# Patient Record
Sex: MALE | Race: WHITE | Employment: UNEMPLOYED | URBAN - METROPOLITAN AREA
[De-identification: names, ages, dates, MRNs, and addresses within clinical notes are randomized per-mention and may not be internally consistent; named-entity substitution may affect disease eponyms.]

---

## 2020-07-03 ENCOUNTER — HOSPITAL ENCOUNTER (EMERGENCY)
Age: 60
Discharge: HOME | End: 2020-07-03
Payer: MEDICAID

## 2020-07-03 VITALS
HEART RATE: 95 BPM | TEMPERATURE: 97.8 F | SYSTOLIC BLOOD PRESSURE: 123 MMHG | OXYGEN SATURATION: 95 % | DIASTOLIC BLOOD PRESSURE: 85 MMHG | RESPIRATION RATE: 16 BRPM

## 2020-07-03 LAB — SARS-COV-2, PCR: NOT DETECTED

## 2020-07-03 PROCEDURE — 99283 EMERGENCY DEPT VISIT LOW MDM: CPT

## 2020-07-03 PROCEDURE — U0003 INFECTIOUS AGENT DETECTION BY NUCLEIC ACID (DNA OR RNA); SEVERE ACUTE RESPIRATORY SYNDROME CORONAVIRUS 2 (SARS-COV-2) (CORONAVIRUS DISEASE [COVID-19]), AMPLIFIED PROBE TECHNIQUE, MAKING USE OF HIGH THROUGHPUT TECHNOLOGIES AS DESCRIBED BY CMS-2020-01-R: HCPCS

## 2020-07-03 RX ORDER — NAPROXEN 500 MG/1
500 TABLET ORAL 2 TIMES DAILY
Qty: 20 TABLET | Refills: 0 | Status: SHIPPED | OUTPATIENT
Start: 2020-07-03 | End: 2020-07-13

## 2020-07-03 RX ORDER — PENICILLIN V POTASSIUM 500 MG/1
500 TABLET ORAL 4 TIMES DAILY
Qty: 28 TABLET | Refills: 0 | Status: SHIPPED | OUTPATIENT
Start: 2020-07-03 | End: 2020-07-10

## 2020-07-03 ASSESSMENT — ENCOUNTER SYMPTOMS
EYE REDNESS: 0
CONSTIPATION: 0
SHORTNESS OF BREATH: 0
SORE THROAT: 0
EYE DISCHARGE: 0
ABDOMINAL DISTENTION: 0
RHINORRHEA: 0
EYE ITCHING: 0
ABDOMINAL PAIN: 0
PHOTOPHOBIA: 0
WHEEZING: 0
VOICE CHANGE: 0
EYE PAIN: 0
BACK PAIN: 0
DIARRHEA: 0
VOMITING: 0
NAUSEA: 0
SINUS PAIN: 0
STRIDOR: 0
COLOR CHANGE: 0
COUGH: 0
TROUBLE SWALLOWING: 0

## 2020-07-03 ASSESSMENT — PAIN SCALES - GENERAL: PAINLEVEL_OUTOF10: 7

## 2020-07-03 NOTE — ED NOTES
Pt tested for COVID via nasal swab, in R nostril. Pt tolerated well. Swab sent directly to Lab. Pt d/c instructions given, v/u. New Scripts for home discussed, pt v/u. Denies needs at this time. A&O with no signs of distress. Pt ambulated to exit.          Sherie Zafar RN  07/03/20 1946

## 2020-07-03 NOTE — ED PROVIDER NOTES
[address] [facility]        Pt Name: Avani Milligan  MRN: [id number]  [place] 1960  Date of evaluation: 7/3/2020  Provider: Reynold Trinidad PA-C  PCP: [facility] (Inactive)    Evaluation by KINGSLEY. My supervising physician was available for consultation. CHIEF COMPLAINT       Chief Complaint   Patient presents with    Other     Pt reporting just released from treatment and is to go to sober living today. Pt thinks he lost his filling on the bottom right and woke up this morning with pain near site radiating down neck. Pt also reports people at the treatment center have been sneezing and coughing all week and he just wants to make sure he is ok before going to sober living. HISTORY OF PRESENT ILLNESS   (Location, Timing/Onset, Context/Setting, Quality, Duration, Modifying Factors, Severity, Associated Signs and Symptoms)  Note limiting factors. Avani Milligan is a 62 y.o. male who presents to the emergency department with complaints of right lower dental pain after a filling fell off of his right lower molar about 5 days ago. He states that 3 nights ago, he was taking some food out of this area with his tongue and cut his tongue. He denies any persistent bleeding since then. He reports some tenderness to the right lower dentition with radiation into the right side of his neck since then. He also states that he has been around others who have been sick with a cough and would like to be tested for COVID-19. However, denies fever, chills, cough, shortness of breath or chest pain. He is going to sober living today and wants to be sure he is not infecting others in the facility. He believes his tetanus to be up to date. Nursing Notes were all reviewed and agreed with or any disagreements were addressed in the HPI.     REVIEW OF SYSTEMS    (2-9 systems for level 4, 10 or more for level 5)     Review of Systems Constitutional: Negative for chills and fever. HENT: Positive for dental problem and mouth sores. Negative for congestion, drooling, ear discharge, ear pain, postnasal drip, rhinorrhea, sinus pain, sore throat, tinnitus, trouble swallowing and voice change. Eyes: Negative for photophobia, pain, discharge, redness, itching and visual disturbance. Respiratory: Negative for cough, shortness of breath, wheezing and stridor. Cardiovascular: Negative for chest pain, palpitations and leg swelling. Gastrointestinal: Negative for abdominal distention, abdominal pain, constipation, diarrhea, nausea and vomiting. Endocrine: Negative. Genitourinary: Negative. Musculoskeletal: Negative for back pain, neck pain and neck stiffness. Skin: Negative for color change, pallor, rash and wound. Neurological: Negative for dizziness, tremors, seizures, syncope, facial asymmetry, speech difficulty, weakness, light-headedness, numbness and headaches. Psychiatric/Behavioral: Negative for confusion. All other systems reviewed and are negative. Positives and Pertinent negatives as per HPI. Except as noted above in the ROS, all other systems were reviewed and negative.        PAST MEDICAL HISTORY     Past Medical History:   Diagnosis Date    Alcohol abuse     MRSA (methicillin resistant staph aureus) culture positive 7/15/2015, 8/3/15    Chest    Seasonal allergies          SURGICAL HISTORY     Past Surgical History:   Procedure Laterality Date    DEBRIDEMENT Left 7/15/15    I and D left chest, left arm and flank    DEBRIDEMENT  07/27/15    I&D and debridement  Multiple wounds    OTHER SURGICAL HISTORY  7/18/2015    Incision and drainage of left axilla, chest wall, and dressing change    OTHER SURGICAL HISTORY  8/3/2015    surgical debridement multiple wounds Dr. Augustin Conklin       Previous Medications    ACETAMINOPHEN 650 MG TABS    Take 650 mg by mouth every 4 hours as needed CITALOPRAM (CELEXA) 40 MG TABLET    Take 40 mg by mouth every morning. DOCUSATE SODIUM (COLACE, DULCOLAX) 100 MG CAPS    Take 100 mg by mouth 2 times daily HOLD for loose stools    ENOXAPARIN (LOVENOX) 40 MG/0.4ML INJECTION    Inject 0.4 mLs into the skin daily    LACTOBACILLUS ACID-PECTIN (ACIDOPHILUS/CITRUS PECTIN) TABS    Take 1 tablet by mouth 2 times daily    MAGNESIUM HYDROXIDE (MILK OF MAGNESIA) 400 MG/5ML SUSPENSION    Take 30 mLs by mouth daily as needed for Constipation    MORPHINE SULFATE (MORPHINE, PF,) 2 MG/ML SOLN INJECTION    Infuse 1 mL intravenously every 3 hours as needed    MORPHINE SULFATE (MORPHINE, PF,) 4 MG/ML INJECTION    Infuse 1 mL intravenously every 3 hours as needed (extra dose as needed during Vac change)    MULTIPLE VITAMINS-MINERALS (THERAPEUTIC MULTIVITAMIN-MINERALS) TABLET    Take 1 tablet by mouth daily    SENNOSIDES-DOCUSATE SODIUM (SENOKOT-S) 8.6-50 MG TABLET    Take 2 tablets by mouth 2 times daily as needed for Constipation    SODIUM CHLORIDE FLUSH 0.9 % INJECTION    Infuse 10 mLs intravenously every 12 hours    SODIUM CHLORIDE FLUSH 0.9 % INJECTION    Infuse 10 mLs intravenously as needed for Line Care (After every IV line use)         ALLERGIES     Patient has no known allergies. FAMILYHISTORY       Family History   Problem Relation Age of Onset    Colon Cancer Mother     Coronary Art Dis Father           SOCIAL HISTORY       Social History     Tobacco Use    Smoking status: Never Smoker    Smokeless tobacco: Never Used   Substance Use Topics    Alcohol use: Not Currently     Comment: 5-8 cans of [employer] Lemonade daily    Drug use: No       SCREENINGS             PHYSICAL EXAM    (up to 7 for level 4, 8 or more for level 5)     ED Triage Vitals [07/03/20 0836]   BP Temp Temp Source Pulse Resp SpO2 Height Weight   123/85 97.8 °F (36.6 °C) Oral 95 16 95 % -- --       Physical Exam  Vitals signs and nursing note reviewed.    Constitutional:       Appearance: Normal appearance. He is well-developed. He is not toxic-appearing or diaphoretic. HENT:      Head: Normocephalic and atraumatic. Jaw: There is normal jaw occlusion. Salivary Glands: Right salivary gland is not diffusely enlarged or tender. Left salivary gland is not diffusely enlarged or tender. Right Ear: Hearing, tympanic membrane, ear canal and external ear normal.      Left Ear: Hearing, tympanic membrane, ear canal and external ear normal.      Nose: Nose normal.      Right Sinus: No maxillary sinus tenderness or frontal sinus tenderness. Left Sinus: No maxillary sinus tenderness or frontal sinus tenderness. Mouth/Throat:      Lips: Pink. Mouth: Mucous membranes are moist. No oral lesions or angioedema. Dentition: Gingival swelling and dental caries present. No dental tenderness, dental abscesses or gum lesions. Tongue: No lesions. Tongue does not deviate from midline. Palate: No mass and lesions. Pharynx: Oropharynx is clear. Uvula midline. Tonsils: No tonsillar exudate or tonsillar abscesses. 1+ on the right. 1+ on the left. Eyes:      General: No scleral icterus. Right eye: No discharge. Left eye: No discharge. Extraocular Movements: Extraocular movements intact. Conjunctiva/sclera: Conjunctivae normal.      Pupils: Pupils are equal, round, and reactive to light. Neck:      Musculoskeletal: Full passive range of motion without pain, normal range of motion and neck supple. Thyroid: No thyroid mass or thyromegaly. Vascular: Normal carotid pulses. No carotid bruit or JVD. Trachea: Trachea and phonation normal.      Meningeal: Brudzinski's sign and Kernig's sign absent. Cardiovascular:      Rate and Rhythm: Normal rate. Pulmonary:      Effort: Pulmonary effort is normal.      Breath sounds: Normal breath sounds. Abdominal:      General: Bowel sounds are normal. There is no distension.       Palpations: Abdomen is soft. Tenderness: There is no abdominal tenderness. There is no right CVA tenderness or left CVA tenderness. Musculoskeletal: Normal range of motion. Cervical back: Normal.      Thoracic back: Normal.      Lumbar back: Normal.   Lymphadenopathy:      Cervical: No cervical adenopathy. Skin:     General: Skin is warm and dry. Capillary Refill: Capillary refill takes less than 2 seconds. Coloration: Skin is not jaundiced or pale. Findings: No bruising, erythema, lesion or rash. Neurological:      General: No focal deficit present. Mental Status: He is alert and oriented to person, place, and time. Psychiatric:         Mood and Affect: Mood normal.         Behavior: Behavior normal.         DIAGNOSTIC RESULTS   LABS:    Labs Reviewed   [id number]   COVID-19   COVID-19       All other labs were within normal range or not returned as of this dictation. EKG: All EKG's are interpreted by the Emergency Department Physician in the absence of a cardiologist.  Please see their note for interpretation of EKG. RADIOLOGY:   Non-plain film images such as CT, Ultrasound and MRI are read by the radiologist. Plain radiographic images are visualized and preliminarily interpreted by the ED Provider with the below findings:        Interpretation per the Radiologist below, if available at the time of this note:    No orders to display     No results found. PROCEDURES   Unless otherwise noted below, none     Procedures    CRITICAL CARE TIME   N/A    CONSULTS:  None      EMERGENCY DEPARTMENT COURSE and DIFFERENTIAL DIAGNOSIS/MDM:   Vitals:    Vitals:    07/03/20 0836   BP: 123/85   Pulse: 95   Resp: 16   Temp: 97.8 °F (36.6 °C)   TempSrc: Oral   SpO2: 95%       Patient was given the following medications:  Medications - No data to display      This patient presents complaining of right lower dental pain after a filling fell off several days ago.   He claims that he cut his tongue against the tooth a few days ago but does not have any visible signs of injury to the tongue or persistent bleeding. He does have local gingivitis around this area. He is advised to follow-up with PCP and dentist for recheck and may return to ED per discharge instructions. Airway is patent. Meningeal signs are negative without nuchal rigidity or local lymphadenopathy or mass. Lungs are clear and abdomen is soft and nontender. He was tested for COVID-19 per his request.  He is otherwise asymptomatic however. He will be sent home with antibiotics to address his dental issue. He was given return precautions. Vital stable here, and patient is stable for discharge. My suspicion is low for otitis, dental abscess, Jimi angina, trench mouth, trigeminal neuralgia, TMJ syndrome, Bell's palsy, shingles, sialadenitis, parotiditis, mumps, strep pharyngitis, peritonsillar or tonsillar abscess, retropharyngeal abscess, bacterial tracheitis, epiglottitis, meningitis, encephalitis, foreign body, angioedema, anaphylaxis, mononucleosis, mumps, lymphoma, leukemia, asthma exacerbation, osteomyelitis, mastoiditis, sepsis, DKA, carotid dissection, sinus abscess, acute fracture, acute CVA, ICH, SAH, TIA, meningitis, encephalitis, pseudotumor cerebri, temporal arteritis, sentinel bleed from ruptured aneurysm, hypertensive urgency or emergency, subdural hematoma, epidural hematoma, ACS, PE, myocarditis, pericarditis, endocarditis, acute pulmonary edema, pleural effusion, pericardial effusion, cardiac tamponade,  CHF exacerbation, thoracic aortic dissection, esophageal rupture, other life-threatening arrhythmia, hypertensive urgency or emergency, hemothorax, pulmonary contusion, subcutaneous emphysema, flail chest, pneumo mediastinum, rib fracture or other concerning pathology. We have addressed concerns and expectations. FINAL IMPRESSION      1. Dentalgia    2.  Acute gingivitis          DISPOSITION/PLAN   DISPOSITION Decision To

## 2024-01-03 ENCOUNTER — APPOINTMENT (OUTPATIENT)
Dept: GENERAL RADIOLOGY | Age: 64
DRG: 377 | End: 2024-01-03
Payer: COMMERCIAL

## 2024-01-03 ENCOUNTER — APPOINTMENT (OUTPATIENT)
Dept: CT IMAGING | Age: 64
DRG: 377 | End: 2024-01-03
Payer: COMMERCIAL

## 2024-01-03 ENCOUNTER — HOSPITAL ENCOUNTER (INPATIENT)
Age: 64
LOS: 2 days | Discharge: HOME | DRG: 377 | End: 2024-01-05
Attending: EMERGENCY MEDICINE | Admitting: INTERNAL MEDICINE
Payer: COMMERCIAL

## 2024-01-03 DIAGNOSIS — F10.10 ALCOHOL ABUSE: ICD-10-CM

## 2024-01-03 DIAGNOSIS — S22.41XA CLOSED FRACTURE OF MULTIPLE RIBS OF RIGHT SIDE, INITIAL ENCOUNTER: ICD-10-CM

## 2024-01-03 DIAGNOSIS — J93.83 ACUTE PNEUMOTHORAX: ICD-10-CM

## 2024-01-03 DIAGNOSIS — K92.1 MELENA: ICD-10-CM

## 2024-01-03 DIAGNOSIS — K92.2 GASTROINTESTINAL HEMORRHAGE, UNSPECIFIED GASTROINTESTINAL HEMORRHAGE TYPE: Primary | ICD-10-CM

## 2024-01-03 LAB
ALBUMIN SERPL-MCNC: 4.3 G/DL (ref 3.4–5)
ALP SERPL-CCNC: 88 U/L (ref 40–129)
ALT SERPL-CCNC: 57 U/L (ref 10–40)
ANION GAP SERPL CALCULATED.3IONS-SCNC: 14 MMOL/L (ref 3–16)
APTT BLD: 30.3 SEC (ref 22.7–35.9)
AST SERPL-CCNC: 73 U/L (ref 15–37)
BASOPHILS # BLD: 0 K/UL (ref 0–0.2)
BASOPHILS # BLD: 0 K/UL (ref 0–0.2)
BASOPHILS NFR BLD: 0.1 %
BASOPHILS NFR BLD: 0.3 %
BILIRUB DIRECT SERPL-MCNC: 0.5 MG/DL (ref 0–0.3)
BILIRUB INDIRECT SERPL-MCNC: 0.9 MG/DL (ref 0–1)
BILIRUB SERPL-MCNC: 1.4 MG/DL (ref 0–1)
BUN SERPL-MCNC: 12 MG/DL (ref 7–20)
CALCIUM SERPL-MCNC: 9.9 MG/DL (ref 8.3–10.6)
CHLORIDE SERPL-SCNC: 86 MMOL/L (ref 99–110)
CO2 SERPL-SCNC: 26 MMOL/L (ref 21–32)
CREAT SERPL-MCNC: 0.6 MG/DL (ref 0.8–1.3)
DEPRECATED RDW RBC AUTO: 13.2 % (ref 12.4–15.4)
DEPRECATED RDW RBC AUTO: 13.3 % (ref 12.4–15.4)
EKG ATRIAL RATE: 108 BPM
EKG DIAGNOSIS: NORMAL
EKG P AXIS: 68 DEGREES
EKG P-R INTERVAL: 150 MS
EKG Q-T INTERVAL: 330 MS
EKG QRS DURATION: 98 MS
EKG QTC CALCULATION (BAZETT): 442 MS
EKG R AXIS: -65 DEGREES
EKG T AXIS: 53 DEGREES
EKG VENTRICULAR RATE: 108 BPM
EOSINOPHIL # BLD: 0 K/UL (ref 0–0.6)
EOSINOPHIL # BLD: 0 K/UL (ref 0–0.6)
EOSINOPHIL NFR BLD: 0.1 %
EOSINOPHIL NFR BLD: 0.4 %
ETHANOLAMINE SERPL-MCNC: NORMAL MG/DL (ref 0–0.08)
FLUAV RNA RESP QL NAA+PROBE: NOT DETECTED
FLUBV RNA RESP QL NAA+PROBE: NOT DETECTED
GFR SERPLBLD CREATININE-BSD FMLA CKD-EPI: >60 ML/MIN/{1.73_M2}
GLUCOSE SERPL-MCNC: 119 MG/DL (ref 70–99)
HCT VFR BLD AUTO: 37.2 % (ref 40.5–52.5)
HCT VFR BLD AUTO: 40.1 % (ref 40.5–52.5)
HGB BLD-MCNC: 12.7 G/DL (ref 13.5–17.5)
HGB BLD-MCNC: 13.7 G/DL (ref 13.5–17.5)
INR PPP: 1.06 (ref 0.84–1.16)
LIPASE SERPL-CCNC: 62 U/L (ref 13–60)
LYMPHOCYTES # BLD: 1.1 K/UL (ref 1–5.1)
LYMPHOCYTES # BLD: 1.4 K/UL (ref 1–5.1)
LYMPHOCYTES NFR BLD: 16.7 %
LYMPHOCYTES NFR BLD: 26 %
MCH RBC QN AUTO: 34.2 PG (ref 26–34)
MCH RBC QN AUTO: 34.9 PG (ref 26–34)
MCHC RBC AUTO-ENTMCNC: 34.1 G/DL (ref 31–36)
MCHC RBC AUTO-ENTMCNC: 34.2 G/DL (ref 31–36)
MCV RBC AUTO: 100.2 FL (ref 80–100)
MCV RBC AUTO: 102.2 FL (ref 80–100)
MONOCYTES # BLD: 0.6 K/UL (ref 0–1.3)
MONOCYTES # BLD: 0.7 K/UL (ref 0–1.3)
MONOCYTES NFR BLD: 10.9 %
MONOCYTES NFR BLD: 10.9 %
NEUTROPHILS # BLD: 3.5 K/UL (ref 1.7–7.7)
NEUTROPHILS # BLD: 4.7 K/UL (ref 1.7–7.7)
NEUTROPHILS NFR BLD: 62.6 %
NEUTROPHILS NFR BLD: 72 %
NT-PROBNP SERPL-MCNC: 387 PG/ML (ref 0–124)
PLATELET # BLD AUTO: 84 K/UL (ref 135–450)
PLATELET # BLD AUTO: 93 K/UL (ref 135–450)
PLATELET BLD QL SMEAR: ABNORMAL
PMV BLD AUTO: 8.6 FL (ref 5–10.5)
PMV BLD AUTO: 8.8 FL (ref 5–10.5)
POTASSIUM SERPL-SCNC: 4 MMOL/L (ref 3.5–5.1)
PROT SERPL-MCNC: 7.7 G/DL (ref 6.4–8.2)
PROTHROMBIN TIME: 13.8 SEC (ref 11.5–14.8)
RBC # BLD AUTO: 3.64 M/UL (ref 4.2–5.9)
RBC # BLD AUTO: 4 M/UL (ref 4.2–5.9)
SARS-COV-2 RNA RESP QL NAA+PROBE: NOT DETECTED
SLIDE REVIEW: ABNORMAL
SODIUM SERPL-SCNC: 126 MMOL/L (ref 136–145)
TROPONIN, HIGH SENSITIVITY: 6 NG/L (ref 0–22)
TROPONIN, HIGH SENSITIVITY: 8 NG/L (ref 0–22)
TROPONIN, HIGH SENSITIVITY: <6 NG/L (ref 0–22)
WBC # BLD AUTO: 5.5 K/UL (ref 4–11)
WBC # BLD AUTO: 6.6 K/UL (ref 4–11)

## 2024-01-03 PROCEDURE — 80048 BASIC METABOLIC PNL TOTAL CA: CPT

## 2024-01-03 PROCEDURE — 82077 ASSAY SPEC XCP UR&BREATH IA: CPT

## 2024-01-03 PROCEDURE — 6370000000 HC RX 637 (ALT 250 FOR IP): Performed by: INTERNAL MEDICINE

## 2024-01-03 PROCEDURE — 70450 CT HEAD/BRAIN W/O DYE: CPT

## 2024-01-03 PROCEDURE — 71250 CT THORAX DX C-: CPT

## 2024-01-03 PROCEDURE — C9113 INJ PANTOPRAZOLE SODIUM, VIA: HCPCS | Performed by: INTERNAL MEDICINE

## 2024-01-03 PROCEDURE — 85610 PROTHROMBIN TIME: CPT

## 2024-01-03 PROCEDURE — 80076 HEPATIC FUNCTION PANEL: CPT

## 2024-01-03 PROCEDURE — 74174 CTA ABD&PLVS W/CONTRAST: CPT

## 2024-01-03 PROCEDURE — A4216 STERILE WATER/SALINE, 10 ML: HCPCS | Performed by: INTERNAL MEDICINE

## 2024-01-03 PROCEDURE — 6370000000 HC RX 637 (ALT 250 FOR IP): Performed by: PHYSICIAN ASSISTANT

## 2024-01-03 PROCEDURE — 86850 RBC ANTIBODY SCREEN: CPT

## 2024-01-03 PROCEDURE — 83690 ASSAY OF LIPASE: CPT

## 2024-01-03 PROCEDURE — HZ2ZZZZ DETOXIFICATION SERVICES FOR SUBSTANCE ABUSE TREATMENT: ICD-10-PCS | Performed by: INTERNAL MEDICINE

## 2024-01-03 PROCEDURE — 71046 X-RAY EXAM CHEST 2 VIEWS: CPT

## 2024-01-03 PROCEDURE — 93005 ELECTROCARDIOGRAM TRACING: CPT | Performed by: PHYSICIAN ASSISTANT

## 2024-01-03 PROCEDURE — 36415 COLL VENOUS BLD VENIPUNCTURE: CPT

## 2024-01-03 PROCEDURE — 96374 THER/PROPH/DIAG INJ IV PUSH: CPT

## 2024-01-03 PROCEDURE — 86900 BLOOD TYPING SEROLOGIC ABO: CPT

## 2024-01-03 PROCEDURE — 2580000003 HC RX 258: Performed by: PHYSICIAN ASSISTANT

## 2024-01-03 PROCEDURE — 85730 THROMBOPLASTIN TIME PARTIAL: CPT

## 2024-01-03 PROCEDURE — 6360000002 HC RX W HCPCS: Performed by: INTERNAL MEDICINE

## 2024-01-03 PROCEDURE — 6360000002 HC RX W HCPCS: Performed by: PHYSICIAN ASSISTANT

## 2024-01-03 PROCEDURE — 6360000004 HC RX CONTRAST MEDICATION: Performed by: PHYSICIAN ASSISTANT

## 2024-01-03 PROCEDURE — 1200000000 HC SEMI PRIVATE

## 2024-01-03 PROCEDURE — 84484 ASSAY OF TROPONIN QUANT: CPT

## 2024-01-03 PROCEDURE — 99285 EMERGENCY DEPT VISIT HI MDM: CPT

## 2024-01-03 PROCEDURE — C9113 INJ PANTOPRAZOLE SODIUM, VIA: HCPCS | Performed by: PHYSICIAN ASSISTANT

## 2024-01-03 PROCEDURE — 83880 ASSAY OF NATRIURETIC PEPTIDE: CPT

## 2024-01-03 PROCEDURE — 87636 SARSCOV2 & INF A&B AMP PRB: CPT

## 2024-01-03 PROCEDURE — 86901 BLOOD TYPING SEROLOGIC RH(D): CPT

## 2024-01-03 PROCEDURE — 2580000003 HC RX 258: Performed by: INTERNAL MEDICINE

## 2024-01-03 PROCEDURE — 85025 COMPLETE CBC W/AUTO DIFF WBC: CPT

## 2024-01-03 RX ORDER — LORAZEPAM 1 MG/1
1 TABLET ORAL
Status: DISCONTINUED | OUTPATIENT
Start: 2024-01-03 | End: 2024-01-05 | Stop reason: HOSPADM

## 2024-01-03 RX ORDER — LORAZEPAM 1 MG/1
2 TABLET ORAL
Status: DISCONTINUED | OUTPATIENT
Start: 2024-01-03 | End: 2024-01-05 | Stop reason: HOSPADM

## 2024-01-03 RX ORDER — MORPHINE SULFATE 2 MG/ML
2 INJECTION, SOLUTION INTRAMUSCULAR; INTRAVENOUS EVERY 4 HOURS PRN
Status: DISCONTINUED | OUTPATIENT
Start: 2024-01-03 | End: 2024-01-05 | Stop reason: HOSPADM

## 2024-01-03 RX ORDER — LORAZEPAM 0.5 MG/1
0.5 TABLET ORAL EVERY 4 HOURS PRN
Status: DISCONTINUED | OUTPATIENT
Start: 2024-01-03 | End: 2024-01-05 | Stop reason: HOSPADM

## 2024-01-03 RX ORDER — SODIUM CHLORIDE 0.9 % (FLUSH) 0.9 %
5-40 SYRINGE (ML) INJECTION EVERY 12 HOURS SCHEDULED
Status: DISCONTINUED | OUTPATIENT
Start: 2024-01-03 | End: 2024-01-05 | Stop reason: HOSPADM

## 2024-01-03 RX ORDER — PANTOPRAZOLE SODIUM 40 MG/10ML
40 INJECTION, POWDER, LYOPHILIZED, FOR SOLUTION INTRAVENOUS ONCE
Status: COMPLETED | OUTPATIENT
Start: 2024-01-03 | End: 2024-01-03

## 2024-01-03 RX ORDER — SODIUM CHLORIDE 9 MG/ML
INJECTION, SOLUTION INTRAVENOUS PRN
Status: DISCONTINUED | OUTPATIENT
Start: 2024-01-03 | End: 2024-01-03 | Stop reason: SDUPTHER

## 2024-01-03 RX ORDER — PSEUDOEPHEDRINE HCL 30 MG
100 TABLET ORAL 2 TIMES DAILY
Status: DISCONTINUED | OUTPATIENT
Start: 2024-01-03 | End: 2024-01-04

## 2024-01-03 RX ORDER — SODIUM CHLORIDE 9 MG/ML
INJECTION, SOLUTION INTRAVENOUS CONTINUOUS
Status: DISCONTINUED | OUTPATIENT
Start: 2024-01-03 | End: 2024-01-04

## 2024-01-03 RX ORDER — ONDANSETRON 4 MG/1
4 TABLET, ORALLY DISINTEGRATING ORAL EVERY 8 HOURS PRN
Status: DISCONTINUED | OUTPATIENT
Start: 2024-01-03 | End: 2024-01-05 | Stop reason: HOSPADM

## 2024-01-03 RX ORDER — SODIUM CHLORIDE 0.9 % (FLUSH) 0.9 %
5-40 SYRINGE (ML) INJECTION PRN
Status: DISCONTINUED | OUTPATIENT
Start: 2024-01-03 | End: 2024-01-05 | Stop reason: HOSPADM

## 2024-01-03 RX ORDER — GAUZE BANDAGE 2" X 2"
100 BANDAGE TOPICAL DAILY
Status: DISCONTINUED | OUTPATIENT
Start: 2024-01-03 | End: 2024-01-05 | Stop reason: HOSPADM

## 2024-01-03 RX ORDER — LIDOCAINE 4 G/G
1 PATCH TOPICAL ONCE
Status: COMPLETED | OUTPATIENT
Start: 2024-01-03 | End: 2024-01-04

## 2024-01-03 RX ORDER — LORAZEPAM 1 MG/1
3 TABLET ORAL
Status: DISCONTINUED | OUTPATIENT
Start: 2024-01-03 | End: 2024-01-05 | Stop reason: HOSPADM

## 2024-01-03 RX ORDER — LORAZEPAM 1 MG/1
4 TABLET ORAL
Status: DISCONTINUED | OUTPATIENT
Start: 2024-01-03 | End: 2024-01-05 | Stop reason: HOSPADM

## 2024-01-03 RX ORDER — CITALOPRAM 40 MG/1
40 TABLET ORAL EVERY MORNING
Status: DISCONTINUED | OUTPATIENT
Start: 2024-01-04 | End: 2024-01-04

## 2024-01-03 RX ORDER — ONDANSETRON 2 MG/ML
4 INJECTION INTRAMUSCULAR; INTRAVENOUS EVERY 6 HOURS PRN
Status: DISCONTINUED | OUTPATIENT
Start: 2024-01-03 | End: 2024-01-05 | Stop reason: HOSPADM

## 2024-01-03 RX ORDER — OXYCODONE HYDROCHLORIDE 5 MG/1
5 TABLET ORAL EVERY 4 HOURS PRN
Status: DISCONTINUED | OUTPATIENT
Start: 2024-01-03 | End: 2024-01-05 | Stop reason: HOSPADM

## 2024-01-03 RX ORDER — SODIUM CHLORIDE 9 MG/ML
INJECTION, SOLUTION INTRAVENOUS PRN
Status: DISCONTINUED | OUTPATIENT
Start: 2024-01-03 | End: 2024-01-05 | Stop reason: HOSPADM

## 2024-01-03 RX ORDER — 0.9 % SODIUM CHLORIDE 0.9 %
1000 INTRAVENOUS SOLUTION INTRAVENOUS ONCE
Status: COMPLETED | OUTPATIENT
Start: 2024-01-03 | End: 2024-01-03

## 2024-01-03 RX ADMIN — PANTOPRAZOLE SODIUM 40 MG: 40 INJECTION, POWDER, FOR SOLUTION INTRAVENOUS at 19:33

## 2024-01-03 RX ADMIN — PANTOPRAZOLE SODIUM 40 MG: 40 INJECTION, POWDER, FOR SOLUTION INTRAVENOUS at 23:26

## 2024-01-03 RX ADMIN — Medication 100 MG: at 19:33

## 2024-01-03 RX ADMIN — OXYCODONE HYDROCHLORIDE 5 MG: 5 TABLET ORAL at 20:49

## 2024-01-03 RX ADMIN — SODIUM CHLORIDE 1000 ML: 0.9 INJECTION, SOLUTION INTRAVENOUS at 16:50

## 2024-01-03 RX ADMIN — SODIUM CHLORIDE, PRESERVATIVE FREE 10 ML: 5 INJECTION INTRAVENOUS at 23:10

## 2024-01-03 RX ADMIN — SODIUM CHLORIDE: 9 INJECTION, SOLUTION INTRAVENOUS at 23:12

## 2024-01-03 RX ADMIN — IOPAMIDOL 75 ML: 755 INJECTION, SOLUTION INTRAVENOUS at 13:52

## 2024-01-03 ASSESSMENT — ENCOUNTER SYMPTOMS
SHORTNESS OF BREATH: 0
DIARRHEA: 0
NAUSEA: 0
BLOOD IN STOOL: 1
VOMITING: 0
EYE REDNESS: 0
EYE DISCHARGE: 0
ABDOMINAL PAIN: 0

## 2024-01-03 ASSESSMENT — PAIN - FUNCTIONAL ASSESSMENT: PAIN_FUNCTIONAL_ASSESSMENT: 0-10

## 2024-01-03 ASSESSMENT — PAIN DESCRIPTION - ORIENTATION
ORIENTATION: MID;OUTER;RIGHT
ORIENTATION: RIGHT

## 2024-01-03 ASSESSMENT — PAIN DESCRIPTION - LOCATION
LOCATION: RIB CAGE
LOCATION: BACK

## 2024-01-03 ASSESSMENT — PAIN SCALES - GENERAL
PAINLEVEL_OUTOF10: 8
PAINLEVEL_OUTOF10: 5

## 2024-01-03 ASSESSMENT — LIFESTYLE VARIABLES
HOW MANY STANDARD DRINKS CONTAINING ALCOHOL DO YOU HAVE ON A TYPICAL DAY: 3 OR 4
HOW OFTEN DO YOU HAVE A DRINK CONTAINING ALCOHOL: MONTHLY OR LESS

## 2024-01-03 NOTE — ED PROVIDER NOTES
THE [facility]  EMERGENCY DEPARTMENT ENCOUNTER          PHYSICIAN ASSISTANT NOTE       Date of evaluation: 1/3/2024    Chief Complaint     Fall (Patient has been feeling rundown for the past 3 weeks, reports no eating/hydrating.Patient sustained fall on Saturday, felt lightheaded before the fall. Patient presenting with bruising on the right side of his back. Reports of black tarry stools occurring after the fall.)      History of Present Illness     Ingird Pearce is a 63 y.o. male presenting with complaints of fall on Saturday. He states that he woke up on Sunday and noticed bruising to the right rib/flank area. He states he also noticed \"back tarry\" bowel movements that began Sunday morning. They appear more brown now but still he is concerned for black in the stools. States for the last 3 weeks he has not been feeling well, he has had some post nasal drainage and generally feels \"funky\", stating he feels a little lightheaded but also notes he hasn't been eating or drinking much due to feeling generally unwell. He denies chest pain,SOB, vomiting. He has a mild sore throat from drainage he states. He denies abdominal pain.     ASSESSMENT / PLAN  (MEDICAL DECISION MAKING)     INITIAL VITALS: BP: (!) 154/83, Temp: 98.2 °F (36.8 °C), Pulse: 95, Respirations: 18, SpO2: 100 %     Briefly this is a 63 y.o. male who presents to the emergency department with generally feeling rundown, right rib pain, black stools.  Patient presented afebrile with normal vitals .  Patient was in no acute distress, nontoxic and non-hypoxic.  Patient was able to complete full sentences at bedside.  Patient was not using accessory muscles.  Patient was able to cooperate with history and physical exam.  Patient's previous charts, labs and imaging was reviewed.  Please see HPI and physical for further details.    On exam, patient overall well-appearing and in no acute cardiorespiratory distress.  Lungs are clear to auscultation

## 2024-01-03 NOTE — ED TRIAGE NOTES
[facility] Emergency Department  MEDICAL SCREENING EXAM    Date of Service: 1/3/2024    Reason for Visit: Fall (Patient has been feeling rundown for the past 3 weeks, reports no eating/hydrating.Patient sustained fall on Saturday, felt lightheaded before the fall. Patient presenting with bruising on the right side of his back. Reports of black tarry stools occurring after the fall.)        Patient History, Brief Exam, and Initial Assessment     Abbreviated HPI: Ingrid Pearce is a 63 y.o. male presenting with complaints of fall on Saturday. He states that he woke up on Sunday and noticed bruising to the right rib/flank area. He states he also noticed \"back tarry\" bowel movements that began Sunday morning. They appear more brown now but still he is concerned for black in the stools. States for the last 3 weeks he has not been feeling well, he has had some post nasal drainage and generally feels \"funky\", stating he feels a little lightheaded but also notes he hasn't been eating or drinking much due to feeling generally unwell. He denies chest pain,SOB, vomiting. He has a mild sore throat from drainage he states. He denies abdominal pain.    INITIAL VITALS: BP: (!) 154/83, Temp: 98.2 °F (36.8 °C), Pulse: 95, Respirations: 18, SpO2: 100 %    Plan     Patient was evaluated in the REU for a medical screening exam.  To further evaluate the presenting complaints, the following orders have been placed:  Orders Placed This Encounter   Procedures    COVID-19 & Influenza Combo    XR CHEST (2 VW)    CT Head W/O Contrast    CT ABDOMEN PELVIS W IV CONTRAST Additional Contrast? None    CT CHEST W CONTRAST    CBC with Auto Differential    BMP w/ Reflex to MG    Hepatic Function Panel    Lipase    Troponin    BNP    Protime-INR    APTT    Ethanol    EKG 12 Lead        See primary provider's note for full details and final disposition.     Current Facility-Administered Medications:   No orders of the defined types were placed in  this encounter.    REU Dispo     Stable for lobby while awaiting ED bed      Relevant Medical History     Past Medical History:   Diagnosis Date    Alcohol abuse     MRSA (methicillin resistant staph aureus) culture positive 7/15/2015, 8/3/15    Chest    Seasonal allergies      Past Surgical History:   Procedure Laterality Date    OTHER SURGICAL HISTORY  7/18/2015    Incision and drainage of left axilla, chest wall, and dressing change    OTHER SURGICAL HISTORY  8/3/2015    surgical debridement multiple wounds Dr. Amos    WOUND DEBRIDEMENT Left 7/15/15    I and D left chest, left arm and flank    WOUND DEBRIDEMENT  07/27/15    I&D and debridement  Multiple wounds     No Known Allergies

## 2024-01-04 ENCOUNTER — ANESTHESIA EVENT (OUTPATIENT)
Dept: ENDOSCOPY | Age: 64
End: 2024-01-04
Payer: COMMERCIAL

## 2024-01-04 ENCOUNTER — ANESTHESIA (OUTPATIENT)
Dept: ENDOSCOPY | Age: 64
End: 2024-01-04
Payer: COMMERCIAL

## 2024-01-04 ENCOUNTER — APPOINTMENT (OUTPATIENT)
Dept: GENERAL RADIOLOGY | Age: 64
DRG: 377 | End: 2024-01-04
Payer: COMMERCIAL

## 2024-01-04 LAB
ABO + RH BLD: NORMAL
ANION GAP SERPL CALCULATED.3IONS-SCNC: 11 MMOL/L (ref 3–16)
BLD GP AB SCN SERPL QL: NORMAL
BUN SERPL-MCNC: 6 MG/DL (ref 7–20)
CALCIUM SERPL-MCNC: 8.5 MG/DL (ref 8.3–10.6)
CHLORIDE SERPL-SCNC: 97 MMOL/L (ref 99–110)
CO2 SERPL-SCNC: 24 MMOL/L (ref 21–32)
CREAT SERPL-MCNC: 0.6 MG/DL (ref 0.8–1.3)
GFR SERPLBLD CREATININE-BSD FMLA CKD-EPI: >60 ML/MIN/{1.73_M2}
GLUCOSE SERPL-MCNC: 97 MG/DL (ref 70–99)
HCT VFR BLD AUTO: 33.9 % (ref 40.5–52.5)
HCT VFR BLD AUTO: 35.8 % (ref 40.5–52.5)
HEMOCCULT STL QL: ABNORMAL
HGB BLD-MCNC: 11.8 G/DL (ref 13.5–17.5)
HGB BLD-MCNC: 12.2 G/DL (ref 13.5–17.5)
POTASSIUM SERPL-SCNC: 3.6 MMOL/L (ref 3.5–5.1)
SODIUM SERPL-SCNC: 132 MMOL/L (ref 136–145)

## 2024-01-04 PROCEDURE — 2709999900 HC NON-CHARGEABLE SUPPLY: Performed by: INTERNAL MEDICINE

## 2024-01-04 PROCEDURE — 1200000000 HC SEMI PRIVATE

## 2024-01-04 PROCEDURE — 7100000010 HC PHASE II RECOVERY - FIRST 15 MIN: Performed by: INTERNAL MEDICINE

## 2024-01-04 PROCEDURE — 85014 HEMATOCRIT: CPT

## 2024-01-04 PROCEDURE — 83550 IRON BINDING TEST: CPT

## 2024-01-04 PROCEDURE — 6370000000 HC RX 637 (ALT 250 FOR IP): Performed by: INTERNAL MEDICINE

## 2024-01-04 PROCEDURE — 6360000002 HC RX W HCPCS: Performed by: INTERNAL MEDICINE

## 2024-01-04 PROCEDURE — 88342 IMHCHEM/IMCYTCHM 1ST ANTB: CPT

## 2024-01-04 PROCEDURE — 36415 COLL VENOUS BLD VENIPUNCTURE: CPT

## 2024-01-04 PROCEDURE — 71046 X-RAY EXAM CHEST 2 VIEWS: CPT

## 2024-01-04 PROCEDURE — 0DB58ZX EXCISION OF ESOPHAGUS, VIA NATURAL OR ARTIFICIAL OPENING ENDOSCOPIC, DIAGNOSTIC: ICD-10-PCS | Performed by: INTERNAL MEDICINE

## 2024-01-04 PROCEDURE — 0DB68ZX EXCISION OF STOMACH, VIA NATURAL OR ARTIFICIAL OPENING ENDOSCOPIC, DIAGNOSTIC: ICD-10-PCS | Performed by: INTERNAL MEDICINE

## 2024-01-04 PROCEDURE — 82270 OCCULT BLOOD FECES: CPT

## 2024-01-04 PROCEDURE — 83540 ASSAY OF IRON: CPT

## 2024-01-04 PROCEDURE — 3700000001 HC ADD 15 MINUTES (ANESTHESIA): Performed by: INTERNAL MEDICINE

## 2024-01-04 PROCEDURE — 85018 HEMOGLOBIN: CPT

## 2024-01-04 PROCEDURE — 2580000003 HC RX 258: Performed by: INTERNAL MEDICINE

## 2024-01-04 PROCEDURE — 2500000003 HC RX 250 WO HCPCS

## 2024-01-04 PROCEDURE — 6370000000 HC RX 637 (ALT 250 FOR IP): Performed by: PHYSICIAN ASSISTANT

## 2024-01-04 PROCEDURE — 3609012400 HC EGD TRANSORAL BIOPSY SINGLE/MULTIPLE: Performed by: INTERNAL MEDICINE

## 2024-01-04 PROCEDURE — 3700000000 HC ANESTHESIA ATTENDED CARE: Performed by: INTERNAL MEDICINE

## 2024-01-04 PROCEDURE — 80048 BASIC METABOLIC PNL TOTAL CA: CPT

## 2024-01-04 PROCEDURE — 6360000002 HC RX W HCPCS

## 2024-01-04 PROCEDURE — A4216 STERILE WATER/SALINE, 10 ML: HCPCS | Performed by: INTERNAL MEDICINE

## 2024-01-04 PROCEDURE — 88312 SPECIAL STAINS GROUP 1: CPT

## 2024-01-04 PROCEDURE — 88305 TISSUE EXAM BY PATHOLOGIST: CPT

## 2024-01-04 PROCEDURE — C9113 INJ PANTOPRAZOLE SODIUM, VIA: HCPCS | Performed by: INTERNAL MEDICINE

## 2024-01-04 RX ORDER — LIDOCAINE HYDROCHLORIDE 20 MG/ML
INJECTION, SOLUTION INFILTRATION; PERINEURAL PRN
Status: DISCONTINUED | OUTPATIENT
Start: 2024-01-04 | End: 2024-01-04 | Stop reason: SDUPTHER

## 2024-01-04 RX ORDER — PROPOFOL 10 MG/ML
INJECTION, EMULSION INTRAVENOUS PRN
Status: DISCONTINUED | OUTPATIENT
Start: 2024-01-04 | End: 2024-01-04 | Stop reason: SDUPTHER

## 2024-01-04 RX ORDER — MIDAZOLAM HYDROCHLORIDE 1 MG/ML
INJECTION INTRAMUSCULAR; INTRAVENOUS PRN
Status: DISCONTINUED | OUTPATIENT
Start: 2024-01-04 | End: 2024-01-04 | Stop reason: SDUPTHER

## 2024-01-04 RX ADMIN — SODIUM CHLORIDE: 9 INJECTION, SOLUTION INTRAVENOUS at 05:57

## 2024-01-04 RX ADMIN — OXYCODONE HYDROCHLORIDE 5 MG: 5 TABLET ORAL at 21:30

## 2024-01-04 RX ADMIN — PANTOPRAZOLE SODIUM 40 MG: 40 INJECTION, POWDER, FOR SOLUTION INTRAVENOUS at 09:07

## 2024-01-04 RX ADMIN — PROPOFOL 150 MG: 10 INJECTION, EMULSION INTRAVENOUS at 15:08

## 2024-01-04 RX ADMIN — PROPOFOL 20 MG: 10 INJECTION, EMULSION INTRAVENOUS at 15:19

## 2024-01-04 RX ADMIN — Medication 100 MG: at 09:07

## 2024-01-04 RX ADMIN — LIDOCAINE HYDROCHLORIDE 100 MG: 20 INJECTION, SOLUTION INFILTRATION; PERINEURAL at 15:08

## 2024-01-04 RX ADMIN — PROPOFOL 50 MG: 10 INJECTION, EMULSION INTRAVENOUS at 15:11

## 2024-01-04 RX ADMIN — MIDAZOLAM HYDROCHLORIDE 2 MG: 2 INJECTION, SOLUTION INTRAMUSCULAR; INTRAVENOUS at 15:00

## 2024-01-04 RX ADMIN — PROPOFOL 20 MG: 10 INJECTION, EMULSION INTRAVENOUS at 15:15

## 2024-01-04 ASSESSMENT — PAIN DESCRIPTION - ORIENTATION: ORIENTATION: RIGHT

## 2024-01-04 ASSESSMENT — PAIN - FUNCTIONAL ASSESSMENT
PAIN_FUNCTIONAL_ASSESSMENT: ACTIVITIES ARE NOT PREVENTED
PAIN_FUNCTIONAL_ASSESSMENT: NONE - DENIES PAIN

## 2024-01-04 ASSESSMENT — PAIN DESCRIPTION - DESCRIPTORS: DESCRIPTORS: SORE

## 2024-01-04 ASSESSMENT — PAIN SCALES - GENERAL: PAINLEVEL_OUTOF10: 4

## 2024-01-04 ASSESSMENT — PAIN DESCRIPTION - LOCATION: LOCATION: RIB CAGE

## 2024-01-04 ASSESSMENT — PAIN DESCRIPTION - PAIN TYPE: TYPE: ACUTE PAIN

## 2024-01-04 ASSESSMENT — PAIN DESCRIPTION - FREQUENCY: FREQUENCY: INTERMITTENT

## 2024-01-04 NOTE — CONSULTS
GI Consult Note      Admission Date: 1/3/2024  Hospital Day: Hospital Day: 2  Attending: Flynn Zamora MD  Date of service: 1/4/24    Subjective:     Chief complaint/ Reason for consult:   Melena       HPI: Ingrid Pearce is a 63 y.o.  male patient, who was seen at the request of Flynn Mauro MD.    History was obtained from chart review and the patient.         63-year-old  male with history of alcohol dependence now presents for further evaluation of melena.  Patient states that on 12/31/2023 he had 2 episodes of black stools.  He another black stool the following day.  Denies any abdominal pain hematochezia.  No iron or Pepto-Bismol ingestion.  He does take NSAIDs with 3-4 doses in the last 2 weeks.  He has had intentional weight loss he states.  He has been try to cut down on his alcohol intake.  Last alcoholic drink was 3 days ago.  No known cirrhosis or varices.  CT angio in the ER was unremarkable.  Hemoglobin 12.7.  He has occasional heartburn.  Denies any dysphagia.  No history of upper endoscopy.  Last colonoscopy he believes was in 2018 with findings of 1 polyp.      Past Endoscopic History:  As above                    Past Medical History:     Past Medical History:   Diagnosis Date    Alcohol abuse     MRSA (methicillin resistant staph aureus) culture positive 7/15/2015, 8/3/15    Chest    Seasonal allergies        Past Surgical History:    Past Surgical History:   Procedure Laterality Date    OTHER SURGICAL HISTORY  7/18/2015    Incision and drainage of left axilla, chest wall, and dressing change    OTHER SURGICAL HISTORY  8/3/2015    surgical debridement multiple wounds Dr. Amos    WOUND DEBRIDEMENT Left 7/15/15    I and D left chest, left arm and flank    WOUND DEBRIDEMENT  07/27/15    I&D and debridement  Multiple wounds         Social History:     Tobacco use:   reports that he has never smoked. He has never used smokeless tobacco.  Alcohol use:    reports that he does not currently use alcohol.  Currently lives in: [place] [id number]   reports no history of drug use.         Family History:       Problem Relation Age of Onset    Colon Cancer Mother     Coronary Art Dis Father            Medications:    sodium chloride flush  5-40 mL IntraVENous 2 times per day    thiamine  100 mg Oral Daily    sodium chloride flush  5-40 mL IntraVENous 2 times per day    pantoprazole (PROTONIX) 40 mg in sodium chloride (PF) 0.9 % 10 mL injection  40 mg IntraVENous Daily             Objective:   PHYSICAL EXAM:      Vitals:   Vitals:    01/03/24 2215 01/03/24 2247 01/04/24 0421 01/04/24 0906   BP: 125/72 114/72 128/67 (!) 156/79   Pulse: 97 96 73 81   Resp: 16 18 16 16   Temp: 98.1 °F (36.7 °C) 98 °F (36.7 °C) 98.1 °F (36.7 °C) 98.7 °F (37.1 °C)   TempSrc:  Oral Oral Oral   SpO2: 98% 97% 96% 98%   Weight:  84.1 kg (185 lb 8 oz)     Height:  1.778 m (5' 10\")         General appearance: alert, cooperative, no distress, appears stated age  Eyes: Anicteric  Head: Normocephalic, without obvious abnormality  Lungs: clear to auscultation bilaterally, Normal Effort  Heart: regular rate and rhythm, normal S1 and S2, no murmurs or rubs  Abdomen: soft, non-tender. Bowel sounds normal. No masses,  no organomegaly.   Extremities: atraumatic, no cyanosis or edema  Skin: warm and dry, no jaundice  Neuro: Grossly intact, A&OX3    Intake and output:   I/O last 3 completed shifts:  In: 2353 [P.O.:340; I.V.:1013; IV Piggyback:1000]  Out: 1650 [Urine:1650]    Lab Data:      CBC:   Recent Labs     01/03/24  1302 01/03/24  2239 01/04/24  1031   WBC 6.6 5.5  --    RBC 4.00* 3.64*  --    HGB 13.7 12.7* 12.2*   HCT 40.1* 37.2* 35.8*   PLT 93* 84*  --    .2* 102.2*  --    MCH 34.2* 34.9*  --    MCHC 34.2 34.1  --    RDW 13.2 13.3  --         BMP:  Recent Labs     01/03/24  1302 01/04/24  1031   * 132*   K 4.0 3.6   CL 86* 97*   CO2 26 24   BUN 12 6*   CREATININE 0.6* 0.6*   CALCIUM 9.9 8.5

## 2024-01-04 NOTE — CARE COORDINATION
Case Management Assessment  Initial Evaluation    Date/Time of Evaluation: 1/4/2024 1:00 PM  Assessment Completed by: Yvette Oliver RN    If patient is discharged prior to next notation, then this note serves as note for discharge by case management.    Patient Name: Ingrid Pearce                   YOB: 1960  Diagnosis: Acute pneumothorax [J93.83]  GI bleed [K92.2]  Closed fracture of multiple ribs of right side, initial encounter [S22.41XA]  Gastrointestinal hemorrhage, unspecified gastrointestinal hemorrhage type [K92.2]                   Date / Time: 1/3/2024  3:30 PM    Patient Admission Status: Inpatient   Readmission Risk (Low < 19, Mod (19-27), High > 27): Readmission Risk Score: 10.1    Current [facility]CP: [facility], Un[facility] (Inactive)  PCP verified by CM? Yes    Chart Reviewed: Yes      History Provided by: Patient  Patient Orientation: Alert and Oriented    Patient Cognition: Alert    Hospitalization in the last 30 days (Readmission):  No    If yes, Readmission Assessment in CM Navigator will be completed.    Advance Directives:      Code Status: Full Code   Patient's Primary Decision Maker is: Legal Next of Kin      Discharge Planning:    Patient lives with: Alone Type of Home: Apartment  Primary Care Giver: Self  Patient Support Systems include: Friends/Neighbors   Current Financial resources: Other (Comment)  Current community resources:    Current services prior to admission: None            Current DME:              Type of Home Care services:  None    ADLS  Prior functional level: Independent in ADLs/IADLs  Current functional level: Independent in ADLs/IADLs    PT AM-PAC:   /24  OT AM-PAC:   /24    Family can provide assistance at DC: No  Would you like Case Management to discuss the discharge plan with any other family members/significant others, and if so, who? No  Plans to Return to Present Housing: Yes  Other Identified Issues/Barriers to RETURNING to current housing: NA  Potential

## 2024-01-04 NOTE — H&P
hydronephrosis. GASTROINTESTINAL: No evidence of abnormal bowel wall thickening or obstruction. Dense material in the cecum is present on all phases. Diverticulosis. Appendix is normal. LYMPH NODES: No pathologically enlarged lymph nodes. PERITONEUM/RETROPERITONEUM: No free air or ascites. PELVIC ORGANS AND BLADDER: Unremarkable. BODY WALL AND SOFT TISSUES: Unremarkable. BONES: Acute fractures of right ribs 10 and 11. Chronic appearing fracture deformity of left rib 9.     1.  No active GI bleeding. High density material in the cecum is present on all phases. 2.  Acute fractures of right ribs 10 and 11 with tiny right-sided pneumothorax as described on concurrent CT chest. 3.  Hepatic steatosis. 4.  Diverticulosis without secondary signs of diverticulitis.     CT CHEST WO CONTRAST    Result Date: 1/3/2024  CT chest without contrast HISTORY: Fall, pain COMPARISON: none CONTRAST: 7/17/2015 TECHNIQUE: Individualized dose optimization technique was used in order to meet ALARA standards for radiation dose reduction. In addition to vendor specific dose reduction algorithms, the dose reduction techniques vary based on the specific scanner utilized but frequently include automated exposure control, adjustment of the mA and/or kV according to patient size, and use of iterative reconstruction technique. COMMENTS: Degenerative changes in the spine with scoliosis. Old nonunited right clavicular fracture. Old rib fractures are noted. Acute nondisplaced right posterior 10th rib fracture. Acute displaced right posterior 11th rib fracture. Diffuse fatty infiltration of the liver. Incompletely visualized, distended gallbladder with small gallstones. Upper abdomen is otherwise unremarkable. Coronary artery calcification. Noncontrast evaluation of the mediastinal structures is otherwise unremarkable. 2 mm left perifissural nodule image 2-49 is incidentally noted. 4 mm left apical nodule image 2-17 and 602-67 is stable in comparison to

## 2024-01-04 NOTE — PROGRESS NOTES
Patient denies any pain, nausea, or vomiting. Patient is resting supine with the HOB at 35 vitals are stable.  Called and gave report to unit nurse in care of this patient. Will call for transport take patient back to the unit from [facility] via stretcher.  All care are needs addressed at this time.

## 2024-01-04 NOTE — PROGRESS NOTES
[place]2.[place]    [facility] [place]      Name:  Ingrid Pearce /Age/Sex: 1960  (63 y.o. male)   MRN & CSN:  [id number] & [id number] Encounter Date/Time: 2024 1:53 PM EST   Location:  [facility]/6301-01 PCP: [facility], Unspecified (Inactive)     Attending:Flynn Zamora MD       Hospital Day: 2    Assessment and Recommendations   Ingrid Pearce is a 63 y.o. male with pmh of lcohol use disorder who presents with complaints of generalized weakness, lightheadedness and dizziness with black tarry stool for 3-4 days  who presents with GI bleed      Plan:   #Alcohol use disorder with episodic binge drinking.  Most recent about a week ago.  No alcohol for the past 2 days  -Patient states he drinks about 15 beers a day over the past 50 years with intermittent sober periods the longest being 7-1/2 months  -Negative ethanol levels  -Continue on thiamine, folic acid, multivitamins  -CIWA protocol with Ativan  -Fall precautions  -Social service consult     #Black tarry stool for 4 days, no with brown stool  -Rule out esophageal varices versus peptic ulcer disease  -Repeat CBC at midnight  -H&H every 6 hourly, type and screen have been stable at 12.2/35.8  -Clear liquid diet for now  -N.p.o. from midnight-okay to eat after procedure  -Protonix IV twice daily  -GI consult-discussed plan of care with GI they will take patient for EGD today     #Dehydration with hemoconcentration.  His H&H today is 13.7/40.  His last lab values are from  which shows H&H of 7-8/25)  -Patient received 1 L of normal saline in ED  -Continue maintenance IV fluids     #Thrombocytopenia-93,000.  Normal values in 2015  -Monitor CBC daily, DVT prophylaxis with SCDs     #Macrocytosis     #Electrolyte abnormalities with hyponatremia  -Monitor electrolytes and renal function with IV hydration     #Fall on 2023 with pain and bruising to the right rib cage.  CTPA with acute right 10th and 11th rib fractures with tiny right-sided apical

## 2024-01-04 NOTE — PROGRESS NOTES
Comprehensive Nutrition Assessment    RECOMMENDATIONS:  PO Diet: NPO- monitor ability to resume PO diet  ONS: Add Ensure BID when PO diet initiated  Nutrition Education: No recommendation at this time     NUTRITION ASSESSMENT:   Nutritional summary & status: Positive screen for poor po and wt loss. Pt admitted after mulitiple falls at home, fatigue and poor PO ~3 weeks. Pt also with black tarry stools. Currently NPO for testing, endorses hunger and hopeful that diet steff be advanced today. No recent wt hx in EMR, unable to assess recent wt loss. RD will continue to monitor ability to advance diet and order ONS whrn PO diet resumes.   Admission // PMH: GI Bleed // ETOH abuse    MALNUTRITION ASSESSMENT  Context of Malnutrition: Chronic Illness   Malnutrition Status: At risk for malnutrition (Comment)    NUTRITION DIAGNOSIS   Predicted inadequate energy intake related to acute injury/trauma as evidenced by NPO or clear liquid status due to medical condition    Nutrition Monitoring and Evaluation:   Food/Nutrient Intake Outcomes:  Diet Advancement/Tolerance  Physical Signs/Symptoms Outcomes:  Biochemical Data, GI Status     OBJECTIVE DATA: Significant to nutrition assessment  Nutrition Related Findings: No BM recorded this admit, black tarry stool pta, Na+ 126,   Wounds: Wound Vac (Left upper extremity)  Nutrition Goals: PO intake 50% or greater, by next RD assessment     CURRENT NUTRITION THERAPIES  Diet NPO Exceptions are: Ice Chips  PO Intake: NPO   PO Supplement Intake:NPO    COMPARATIVE STANDARDS  Energy (kcal):  1532-7029 (22-25)     Protein (g):   (1-1.2)       Fluid (ml/day):  6549-8846 ml (per MD order, noted low sodium)    ANTHROPOMETRICS  Current Height: 177.8 cm (5' 10\")  Current Weight - Scale: 84.1 kg (185 lb 8 oz)    Admission weight: 84.2 kg (185 lb 10 oz)    The patient will be monitored per nutrition standards of care. Consult dietitian if additional nutrition interventions are needed  prior to RD reassessment.     Shayy Gentile RD  [place] A:  [phone]  Office:  [phone]

## 2024-01-04 NOTE — ED NOTES
ED TO INPATIENT SBAR HANDOFF    Patient Name: Ingrid Pearce   :  1960  63 y.o.   MRN:  [id number]  Preferred Name    ED Room #:  B19/B19-19  Family/Caregiver Present no   Restraints no   Sitter no   Sepsis Risk Score Sepsis Risk Score: 1.72    Situation  Code Status: Prior No additional code details.    Allergies: Patient has no known allergies.  Weight: Patient Vitals for the past 96 hrs (Last 3 readings):   Weight   24 1243 84.2 kg (185 lb 10 oz)     Arrived from: home  Chief Complaint:   Chief Complaint   Patient presents with    Fall     Patient has been feeling rundown for the past 3 weeks, reports no eating/hydrating.Patient sustained fall on Saturday, felt lightheaded before the fall. Patient presenting with bruising on the right side of his back. Reports of black tarry stools occurring after the fall.     Hospital Problem/Diagnosis:  Principal Problem:    GI bleed  Resolved Problems:    * No resolved hospital problems. *    Imaging:   XR CHEST (2 VW)   Final Result   No acute findings.      Electronically signed by Janet Bragg      CTA ABDOMEN PELVIS W WO CONTRAST   Final Result   1.  No active GI bleeding. High density material in the cecum is present on all   phases.   2.  Acute fractures of right ribs 10 and 11 with tiny right-sided pneumothorax   as described on concurrent CT chest.   3.  Hepatic steatosis.   4.  Diverticulosis without secondary signs of diverticulitis.         CT CHEST WO CONTRAST   Final Result      Acute right 10th and 11th rib fractures.      Tiny right basilar pneumothorax.      Hepatic steatosis.      Pulmonary nodules require no follow-up.            CT Head W/O Contrast   Final Result   1.  No acute intracranial abnormality.          XR CHEST (2 VW)   Final Result      Clear lungs.      Normal cardiomediastinal silhouette.      Old right rib fractures.        Abnormal labs:   Abnormal Labs Reviewed   CBC WITH AUTO DIFFERENTIAL - Abnormal; Notable for the  following components:       Result Value    RBC 4.00 (*)     Hematocrit 40.1 (*)     .2 (*)     MCH 34.2 (*)     Platelets 93 (*)     All other components within normal limits   BASIC METABOLIC PANEL W/ REFLEX TO MG FOR LOW K - Abnormal; Notable for the following components:    Sodium 126 (*)     Chloride 86 (*)     Glucose 119 (*)     Creatinine 0.6 (*)     All other components within normal limits   HEPATIC FUNCTION PANEL - Abnormal; Notable for the following components:    ALT 57 (*)     AST 73 (*)     Total Bilirubin 1.4 (*)     Bilirubin, Direct 0.5 (*)     All other components within normal limits   LIPASE - Abnormal; Notable for the following components:    Lipase 62.0 (*)     All other components within normal limits   BRAIN NATRIURETIC PEPTIDE - Abnormal; Notable for the following components:    Pro- (*)     All other components within normal limits     Critical values: no     Abnormal Assessment Findings:     Background  History:   Past Medical History:   Diagnosis Date    Alcohol abuse     MRSA (methicillin resistant staph aureus) culture positive 7/15/2015, 8/3/15    Chest    Seasonal allergies        Assessment    Vitals/MEWS: MEWS Score: 1  Level of Consciousness: Alert (0)   Vitals:    01/03/24 1243 01/03/24 1530 01/03/24 1844   BP: (!) 154/83 128/85 (!) 147/88   Pulse: 95 91 98   Resp: 18 20 16   Temp: 98.2 °F (36.8 °C) 98.6 °F (37 °C)    TempSrc: Oral Oral    SpO2: 100% 97% 98%   Weight: 84.2 kg (185 lb 10 oz)     Height: 1.778 m (5' 10\")       FiO2 (%):   O2 Flow Rate: O2 Device: None (Room air)    Cardiac Rhythm:    Pain Assessment:  [x] Verbal [] Lozada Young Scale  Pain Scale: Pain Assessment  Pain Assessment: 0-10  Pain Level: 8  Pain Location: Back  Pain Orientation: Mid, Outer, Right  Last documented pain score (0-10 scale) Pain Level: 8  Last documented pain medication administered: NA  Mental Status: oriented, alert, coherent, logical, thought processes intact, and able to

## 2024-01-04 NOTE — ANESTHESIA POSTPROCEDURE EVALUATION
Department of Anesthesiology  Postprocedure Note    Patient: Ingrid Pearce  MRN: [id number]  YOB: 1960  Date of evaluation: 1/4/2024    Procedure Summary       Date: 01/04/24 Room / Location: [place] [address] / [facility]    Anesthesia Start: 1456 Anesthesia Stop: 1528    Procedure: EGD BIOPSY Diagnosis: Melena    Surgeons: Preet Finch MD Responsible Provider: Norbert Barajas MD    Anesthesia Type: MAC ASA Status: 2            Anesthesia Type: MAC    Timothy Phase I: Timothy Score: 10    Timothy Phase II: Timothy Score: 10    Anesthesia Post Evaluation    Patient location during evaluation: PACU  Patient participation: complete - patient participated  Level of consciousness: awake  Pain score: 0  Nausea & Vomiting: no nausea and no vomiting  Cardiovascular status: blood pressure returned to baseline  Respiratory status: acceptable  Hydration status: euvolemic  Pain management: adequate    No notable events documented.

## 2024-01-04 NOTE — PLAN OF CARE
Problem: Safety - Adult  Goal: Free from fall injury  Outcome: Progressing  Flowsheets (Taken 1/3/2024 2350)  Free From Fall Injury: Based on caregiver fall risk screen, instruct family/caregiver to ask for assistance with transferring infant if caregiver noted to have fall risk factors  Note: Patient is high risk for fall. Discuss with patient safety measures and ensure all are in place. Bed lock in lowest position, bed alarm on, non skid sock worn by patient and call light within patient reach. Patient is free from fall at this time.     Problem: Hematologic - Adult  Goal: Maintains hematologic stability  Outcome: Progressing  Flowsheets (Taken 1/3/2024 2350)  Maintains hematologic stability:   Assess for signs and symptoms of bleeding or hemorrhage   Monitor labs for bleeding or clotting disorders  Note: Patient came with complaint of Black Tarry stool. Monitor any signs of bleeding and monitor H&H.     Problem: Discharge Planning  Goal: Discharge to home or other facility with appropriate resources  Outcome: Progressing

## 2024-01-04 NOTE — ANESTHESIA PRE PROCEDURE
injection 5-40 mL  5-40 mL IntraVENous 2 times per day Brigida Soto MD   10 mL at 01/03/24 2310   • sodium chloride flush 0.9 % injection 5-40 mL  5-40 mL IntraVENous PRN Brigida Soto MD       • ondansetron (ZOFRAN-ODT) disintegrating tablet 4 mg  4 mg Oral Q8H PRN Brigida Soto MD        Or   • ondansetron (ZOFRAN) injection 4 mg  4 mg IntraVENous Q6H PRN Brigida Soto MD       • 0.9 % sodium chloride infusion   IntraVENous Continuous Brigida Soto  mL/hr at 01/04/24 1430 NoRateChange at 01/04/24 1430   • pantoprazole (PROTONIX) 40 mg in sodium chloride (PF) 0.9 % 10 mL injection  40 mg IntraVENous Daily Brigida Soto MD   40 mg at 01/04/24 0907   • morphine (PF) injection 2 mg  2 mg IntraVENous Q4H PRN Brigida Soto MD       • oxyCODONE (ROXICODONE) immediate release tablet 5 mg  5 mg Oral Q4H PRN Brigida Soto MD   5 mg at 01/03/24 2049       Allergies:  No Known Allergies    Problem List:    Patient Active Problem List   Diagnosis Code   • Cellulitis L03.90   • Sepsis ([facility]) A41.9   • Alcohol abuse F10.10   • Cellulitis of left axilla L03.112   • Hyponatremia E87.1   • Renal insufficiency N28.9   • Severe sepsis ([facility]) A41.9, R65.20   • Necrotizing fasciitis ([facility]) M72.6   • Staph aureus infection A49.01   • MRSA (methicillin resistant Staphylococcus aureus) septicemia ([facility]) A41.02   • Infection due to Enterobacteriaceae A49.8   • GI bleed K92.2       Past Medical History:        Diagnosis Date   • Alcohol abuse    • MRSA (methicillin resistant staph aureus) culture positive 7/15/2015, 8/3/15    Chest   • Seasonal allergies        Past Surgical History:        Procedure Laterality Date   • OTHER SURGICAL HISTORY  7/18/2015    Incision and drainage of left axilla, chest wall, and dressing change   • OTHER SURGICAL HISTORY  8/3/2015    surgical debridement multiple wounds Dr. Amos   • WOUND DEBRIDEMENT Left 7/15/15    I and D

## 2024-01-04 NOTE — PROGRESS NOTES
4 Eyes Admission Assessment     I agree as the admission nurse that 2 RN's have performed a thorough Head to Toe Skin Assessment on the patient. ALL assessment sites listed below have been assessed on admission.       Areas assessed by both nurses: Shanta/Natalie  [x]   Head, Face, and Ears   [x]   Shoulders, Back, and Chest  [x]   Arms, Elbows, and Hands   [x]   Coccyx, Sacrum, and Ischum  [x]   Legs, Feet, and Heels        Does the Patient have Skin Breakdown?  No         Pelon Prevention initiated:  No   Wound Care Orders initiated:  No      [facility] nurse consulted for Pressure Injury (Stage 3,4, Unstageable, DTI, NWPT, and Complex wounds):  No      Nurse 1 eSignature: Electronically signed by Shanta Juarez RN on 1/4/24 at 12:22 AM EST    **SHARE this note so that the co-signing nurse is able to place an eSignature**    Nurse 2 eSignature: Electronically signed by Natalie King RN on 1/4/24 at 5:21 AM EST

## 2024-01-04 NOTE — PROCEDURES
[facility]  Endoscopy Note    Patient: Ingrid Pearce  : 1960  Acct#:     Procedure: Esophagogastroduodenoscopy with biopsy    Date:  2024     Surgeon:  RADHA HERRERA MD      Anesthesia:    IV propofol, per anesthesia       EBL: <50 mL    Indications:  melena    Description of Procedure:    Informed consent was obtained from the patient after explanation of indications, benefits and possible risks and complications of the procedure.      The patient was then taken to the endoscopy suite, placed in the left lateral decubitus position and the above IV sedation was administrered.    The Olympus videoendoscope (GIF-H190) was placed in the patient's mouth and under direct visualization passed into the esophagus.  The scope was then advanced into the stomach and to the second portion of the duodenum.  A retroflexed exam of the gastric cardia and fundus was performed. The scope was then withdrawn back into the stomach, it was decompressed, and the scope was completely withdrawn.    Findings:  A few erosions and small ulcerations in the second portion of the duodenum.  Normal duodenal bulb.  Normal stomach.  Biopsies were obtained evaluate for H. pylori.  Grade D reflux esophagitis in the entire lower third of the esophagus.  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  No varices.  A careful biopsy was taken from 1 tongue of salmon mucosa.  Bleeding ensued from the biopsy site so no further biopsies were taken.  Hemostasis was obtained at the end of the procedure.         The patient tolerated the procedure well and was taken to the post anesthesia care unit in good condition.    Biopsies: Yes    Impression:   A few erosions and small ulcerations in the second portion of the duodenum.  Normal duodenal bulb.  Normal stomach.  Biopsies were obtained evaluate for H. pylori.  Grade D reflux esophagitis in the entire lower third of the esophagus.  Possible Greene's

## 2024-01-04 NOTE — FLOWSHEET NOTE
Received from ED % Black Tarry stool with history of fall 2x Thursday with Right Knee healed wound (Scab is present) and the last fall on Saturday 12/30 which leave him with Right side rib bruising with pain. Last drink according to him was Monday. A/Ox4 on room air. [place] to staff,bed set up, room and use of call light. Discuss with patient safety measures and ensure all are in place. VSS. Hooked to Telemetry Normal Sinus Rhythm.       01/03/24 2247   Vitals   Temp 98 °F (36.7 °C)   Temp Source Oral   Pulse 96   Heart Rate Source Monitor   Respirations 18   /72   MAP (Calculated) 86   BP Location Left upper arm   BP Upper/Lower Upper   BP Method Automatic   Patient Position Up in chair;Sitting   Pain Assessment   Pain Assessment None - Denies Pain   Opioid-Induced Sedation   POSS Score 1   RASS   [place] Agitation Sedation Scale (RASS) 0   Oxygen Therapy   SpO2 97 %   Pulse Oximeter Device Mode Intermittent   Pulse Oximeter Device Location Left;Finger   O2 Device None (Room air)

## 2024-01-05 VITALS
WEIGHT: 185.5 LBS | HEART RATE: 109 BPM | HEIGHT: 70 IN | RESPIRATION RATE: 18 BRPM | BODY MASS INDEX: 26.56 KG/M2 | TEMPERATURE: 98.5 F | OXYGEN SATURATION: 99 % | DIASTOLIC BLOOD PRESSURE: 88 MMHG | SYSTOLIC BLOOD PRESSURE: 139 MMHG

## 2024-01-05 LAB
IRON SATN MFR SERPL: 33 % (ref 20–50)
IRON SERPL-MCNC: 66 UG/DL (ref 59–158)
TIBC SERPL-MCNC: 203 UG/DL (ref 260–445)

## 2024-01-05 PROCEDURE — 6360000002 HC RX W HCPCS: Performed by: INTERNAL MEDICINE

## 2024-01-05 PROCEDURE — C9113 INJ PANTOPRAZOLE SODIUM, VIA: HCPCS | Performed by: INTERNAL MEDICINE

## 2024-01-05 PROCEDURE — 2580000003 HC RX 258: Performed by: INTERNAL MEDICINE

## 2024-01-05 PROCEDURE — A4216 STERILE WATER/SALINE, 10 ML: HCPCS | Performed by: INTERNAL MEDICINE

## 2024-01-05 PROCEDURE — 6370000000 HC RX 637 (ALT 250 FOR IP): Performed by: INTERNAL MEDICINE

## 2024-01-05 RX ORDER — CHLORDIAZEPOXIDE HYDROCHLORIDE 25 MG/1
25 CAPSULE, GELATIN COATED ORAL 3 TIMES DAILY PRN
Qty: 20 CAPSULE | Refills: 0 | Status: SHIPPED | OUTPATIENT
Start: 2024-01-05 | End: 2024-02-04

## 2024-01-05 RX ORDER — CITALOPRAM 40 MG/1
40 TABLET ORAL EVERY MORNING
Qty: 30 TABLET | Refills: 3 | Status: SHIPPED | OUTPATIENT
Start: 2024-01-05

## 2024-01-05 RX ORDER — GABAPENTIN 100 MG/1
100 CAPSULE ORAL 2 TIMES DAILY
Qty: 60 CAPSULE | Refills: 0 | Status: SHIPPED | OUTPATIENT
Start: 2024-01-05 | End: 2024-02-04

## 2024-01-05 RX ORDER — THIAMINE MONONITRATE (VIT B1) 100 MG
100 TABLET ORAL DAILY
Qty: 30 TABLET | Refills: 0 | Status: SHIPPED | OUTPATIENT
Start: 2024-01-06

## 2024-01-05 RX ORDER — PANTOPRAZOLE SODIUM 40 MG/1
40 TABLET, DELAYED RELEASE ORAL
Qty: 180 TABLET | Refills: 1 | Status: SHIPPED | OUTPATIENT
Start: 2024-01-05 | End: 2024-07-03

## 2024-01-05 RX ADMIN — Medication 100 MG: at 08:42

## 2024-01-05 RX ADMIN — SODIUM CHLORIDE, PRESERVATIVE FREE 10 ML: 5 INJECTION INTRAVENOUS at 08:45

## 2024-01-05 RX ADMIN — Medication 5 ML: at 13:09

## 2024-01-05 RX ADMIN — PANTOPRAZOLE SODIUM 40 MG: 40 INJECTION, POWDER, FOR SOLUTION INTRAVENOUS at 08:42

## 2024-01-05 NOTE — PROGRESS NOTES
D/C order noted. SL and tele removed. Pt verbalized understanding of how to properly take librium and other new medications. States he will follow up with outpatient alcohol reha. Transported off unit per wheelchair.

## 2024-01-05 NOTE — DISCHARGE SUMMARY
V2.0  Discharge Summary    Name:  Ingrid Pearce /Age/Sex: 1960 (63 y.o. male)   Admit Date: 1/3/2024  Discharge Date: 24    MRN & CSN:  [id number] & [id number] Encounter Date and Time 24 2:52 PM EST    Attending:  Flynn Zamora MD Discharging Provider: ARCADIO Burton [facility] Course:     Brief HPI: Ingrid Pearce is a 63 y.o. male who presented with  pmh of lcohol use disorder who presents with complaints of generalized weakness, lightheadedness and dizziness with black tarry stool for 3-4 days  who presents with GI bleed     Brief Problem Based Course:   Plan:   #Alcohol use disorder with episodic binge drinking.  Most recent about a week ago.  No alcohol for the past 2 days  -Patient states he drinks about 15 beers a day over the past 50 years with intermittent sober periods the longest being 7-1/2 months  -Negative ethanol levels  -Continue on thiamine, folic acid, multivitamins  -CIWA protocol with Ativan  -Fall precautions  -Social service consult  -Patient was advised to quit alcohol; Librium ordered; patient does not want to go to alcohol rehab     #Black tarry stool for 4 days, no with brown stool  -Rule out esophageal varices versus peptic ulcer disease  -Repeat CBC at midnight  -H&H every 6 hourly, type and screen have been stable at 12.2/35.8  -Clear liquid diet for now  -N.p.o. from midnight-okay to eat after procedure  -Protonix IV twice daily  -GI consult-discussed plan of care with GI they will take patient for EGD today  Patient under went EGD which showed few erosions and small ulceration in the small portion of duodenum.  Grade D reflux esophagitis was noted.  Biopsy pending.  GI recommended PPI twice daily for 8 weeks then daily.     #Dehydration with hemoconcentration.  His H&H today is 13.7/40.  His last lab values are from  which shows H&H of 7-8/25)  -Patient received 1 L of normal saline in ED  -Continue maintenance IV fluids      GLUCOSE 119* 97     Hepatic:   Recent Labs     01/03/24  1302   AST 73*   ALT 57*   BILITOT 1.4*   ALKPHOS 88     Lipids: No results found for: \"CHOL\", \"HDL\", \"TRIG\"  Hemoglobin A1C: No results found for: \"LABA1C\"  TSH: No results found for: \"TSH\"  Troponin: No results found for: \"TROPONINT\"  Lactic Acid: No results for input(s): \"LACTA\" in the last 72 hours.  BNP:   Recent Labs     01/03/24  1302   PROBNP 387*     UA:  Lab Results   Component Value Date/Time    NITRU see below 07/14/2015 07:33 PM    COLORU ORANGE 07/14/2015 07:33 PM    PHUR see below 07/14/2015 07:33 PM    LABCAST 3-5 Fine Gran. 07/14/2015 07:33 PM    WBCUA 30 07/14/2015 07:33 PM    RBCUA 5-10 07/14/2015 07:33 PM    CLARITYU TURBID 07/14/2015 07:33 PM    SPECGRAV 1.027 07/14/2015 07:33 PM    LEUKOCYTESUR see below 07/14/2015 07:33 PM    UROBILINOGEN see below 07/14/2015 07:33 PM    BILIRUBINUR see below 07/14/2015 07:33 PM    BLOODU see below 07/14/2015 07:33 PM    GLUCOSEU see below 07/14/2015 07:33 PM    KETUA see below 07/14/2015 07:33 PM     Urine Cultures: No results found for: \"LABURIN\"  Blood Cultures: No results found for: \"BC\"  No results found for: \"BLOODCULT2\"  Organism: No results found for: \"ORG\"    Time Spent Discharging patient >31 minutes    Electronically signed by ARCADIO Burton CNP on 1/5/2024 at 2:52 PM

## 2024-01-05 NOTE — CARE COORDINATION
Case Management Assessment            Discharge Note                    Date / Time of Note: 1/5/2024 1:38 PM                  Discharge Note Completed by: Yvette Oliver RN    Patient Name: Ingrid Pearce   YOB: 1960  Diagnosis: Acute pneumothorax [J93.83]  GI bleed [K92.2]  Closed fracture of multiple ribs of right side, initial encounter [S22.41XA]  Gastrointestinal hemorrhage, unspecified gastrointestinal hemorrhage type [K92.2]   Date / Time: 1/3/2024  3:30 PM    Current PCP: [facility] (Inactive)  Clinic patient: Yes    Hospitalization in the last 30 days: No       Advance Directives:  Code Status: Full Code  [place] DNR form completed and on chart: No    Financial:  Payor: HUMANA / Plan: HUMANA (PPO) / Product Type: *No Product type* /      Pharmacy:  No Pharmacies Listed    Assistance purchasing medications?: Potential Assistance Purchasing Medications: No  Assistance provided by Case Management: None at this time    Does patient want to participate in local refill/ meds to beds program?:      Meds To Beds General Rules:  1. Can ONLY be done Monday- Friday between 8:30am-5pm  2. Prescription(s) must be in pharmacy by 3pm to be filled same day  3.Copy of patient's insurance/ prescription drug card and patient face sheet must be sent along with the prescription(s)  4. Cost of Rx cannot be added to hospital bill. If financial assistance is needed, please contact unit  or ;  or  CANNOT provide pharmacy voucher for patients co-pays  5. Patients can then  the prescription on their way out of the hospital at discharge, or pharmacy can deliver to the bedside if staff is available. (payment due at time of pick-up or delivery - cash, check, or card accepted)     Able to afford home medications/ co-pay costs: Yes    ADLS:  Current PT AM-PAC Score:   /24  Current OT AM-PAC Score:   /24      DISCHARGE Disposition: Home- No Services  related to the following treatment goals of Acute pneumothorax [J93.83]  GI bleed [K92.2]  Closed fracture of multiple ribs of right side, initial encounter [S22.41XA]  Gastrointestinal hemorrhage, unspecified gastrointestinal hemorrhage type [K92.2]    The Patient and/or patient representative Ingrid and his family were provided with a choice of provider and agrees with the discharge plan Yes    Freedom of choice list was provided with basic dialogue that supports the patient's individualized plan of care/goals and shares the quality data associated with the providers. Yes    Care Transitions patient: No    Yvette Oliver RN  [facility]  Case Management Department  Ph: [phone]

## 2024-01-05 NOTE — PLAN OF CARE
Problem: Discharge Planning  Goal: Discharge to home or other facility with appropriate resources  Outcome: Progressing     Problem: Safety - Adult  Goal: Free from fall injury  Outcome: Progressing  Note: Pt remains free from falls through this shift, alert and oriented x4, ambulates independently with steady gait. Call light and pt belongings within reach, uses call light appropriately. Will continue to monitor.     Problem: Hematologic - Adult  Goal: Maintains hematologic stability  Outcome: Progressing  Note: Pt has no S/S of bleeding, H&H stable.     Problem: Nutrition Deficit:  Goal: Optimize nutritional status  Outcome: Progressing     Problem: Pain  Goal: Verbalizes/displays adequate comfort level or baseline comfort level  Outcome: Progressing  Note: Pt complains of slight discomfort on right side of ribcage. Pt medicated with PRN oxycodone as ordered. Pt resting comfortably in bed. Will continue to monitor.

## 2024-01-05 NOTE — PROGRESS NOTES
[place] [facility]  Gastroenterology Progress Note    Ingrid Pearce is a 63 y.o. male patient.  Principal Problem:    GI bleed  Resolved Problems:    * No resolved hospital problems. *      SUBJECTIVE:    No further bleeding.  No bowel movements.  No abdominal pain    Physical    VITALS:  /88   Pulse (!) 109   Temp 98.5 °F (36.9 °C) (Oral)   Resp 18   Ht 1.778 m (5' 10\")   Wt 84.1 kg (185 lb 8 oz)   SpO2 99%   BMI 26.62 kg/m²   TEMPERATURE:  Current - Temp: 98.5 °F (36.9 °C); Max - Temp  Av °F (36.7 °C)  Min: 97 °F (36.1 °C)  Max: 98.5 °F (36.9 °C)    NAD, age appropriate  Integ: no rash, jaundice, ecchymoses  Abdomen soft, ND, NT, no HSM, Bowel sounds normal.    Data    Data Review:    Recent Labs     24  1302 24  2239 24  1031 24  1828   WBC 6.6 5.5  --   --    HGB 13.7 12.7* 12.2* 11.8*   HCT 40.1* 37.2* 35.8* 33.9*   .2* 102.2*  --   --    PLT 93* 84*  --   --      Recent Labs     24  1302 24  1031   * 132*   K 4.0 3.6   CL 86* 97*   CO2 26 24   BUN 12 6*   CREATININE 0.6* 0.6*     Recent Labs     24  1302   AST 73*   ALT 57*   BILIDIR 0.5*   BILITOT 1.4*   ALKPHOS 88     Recent Labs     24  1302   LIPASE 62.0*     Recent Labs     24  1302   PROTIME 13.8   INR 1.06     No results for input(s): \"PTT\" in the last 72 hours.          ASSESSMENT   1.  Melena-no further episodes.  EGD done yesterday showed  Impression:   A few erosions and small ulcerations in the second portion of the duodenum.  Normal duodenal bulb.  Normal stomach.  Biopsies were obtained evaluate for H. pylori.  Grade D reflux esophagitis in the entire lower third of the esophagus.  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  No varices.  A careful biopsy was taken from 1 tongue of salmon mucosa.  Bleeding ensued from the biopsy site so no further biopsies were taken.  Hemostasis was obtained at the end of the procedure.     Recommendations:

## 2024-01-18 ENCOUNTER — APPOINTMENT (OUTPATIENT)
Dept: CT IMAGING | Age: 64
DRG: 897 | End: 2024-01-18
Payer: MEDICARE

## 2024-01-18 ENCOUNTER — APPOINTMENT (OUTPATIENT)
Dept: GENERAL RADIOLOGY | Age: 64
DRG: 897 | End: 2024-01-18
Payer: MEDICARE

## 2024-01-18 ENCOUNTER — HOSPITAL ENCOUNTER (INPATIENT)
Age: 64
LOS: 4 days | Discharge: HOME | DRG: 897 | End: 2024-01-22
Attending: EMERGENCY MEDICINE | Admitting: INTERNAL MEDICINE
Payer: MEDICARE

## 2024-01-18 DIAGNOSIS — S09.90XA CLOSED HEAD INJURY, INITIAL ENCOUNTER: Primary | ICD-10-CM

## 2024-01-18 DIAGNOSIS — F10.929 ACUTE ALCOHOLIC INTOXICATION WITH COMPLICATION (HCC): ICD-10-CM

## 2024-01-18 DIAGNOSIS — R55 SYNCOPE AND COLLAPSE: ICD-10-CM

## 2024-01-18 LAB
ALBUMIN SERPL-MCNC: 3.8 G/DL (ref 3.4–5)
ALP SERPL-CCNC: 107 U/L (ref 40–129)
ALT SERPL-CCNC: 43 U/L (ref 10–40)
ANION GAP SERPL CALCULATED.3IONS-SCNC: 15 MMOL/L (ref 3–16)
AST SERPL-CCNC: 52 U/L (ref 15–37)
BASE EXCESS BLDV CALC-SCNC: 0.7 MMOL/L (ref -2–3)
BASOPHILS # BLD: 0 K/UL (ref 0–0.2)
BASOPHILS NFR BLD: 0.3 %
BILIRUB DIRECT SERPL-MCNC: <0.2 MG/DL (ref 0–0.3)
BILIRUB INDIRECT SERPL-MCNC: ABNORMAL MG/DL (ref 0–1)
BILIRUB SERPL-MCNC: 0.3 MG/DL (ref 0–1)
BILIRUB UR QL STRIP.AUTO: NEGATIVE
BUN SERPL-MCNC: 5 MG/DL (ref 7–20)
CALCIUM SERPL-MCNC: 8.8 MG/DL (ref 8.3–10.6)
CHLORIDE SERPL-SCNC: 95 MMOL/L (ref 99–110)
CLARITY UR: CLEAR
CO2 BLDV-SCNC: 29 MMOL/L
CO2 SERPL-SCNC: 23 MMOL/L (ref 21–32)
COHGB MFR BLDV: 1.2 % (ref 0–1.5)
COLOR UR: YELLOW
CREAT SERPL-MCNC: 0.7 MG/DL (ref 0.8–1.3)
DEPRECATED RDW RBC AUTO: 15.4 % (ref 12.4–15.4)
DO-HGB MFR BLDV: 39.1 %
EOSINOPHIL # BLD: 0 K/UL (ref 0–0.6)
EOSINOPHIL NFR BLD: 0.2 %
ETHANOLAMINE SERPL-MCNC: 202 MG/DL (ref 0–0.08)
GFR SERPLBLD CREATININE-BSD FMLA CKD-EPI: >60 ML/MIN/{1.73_M2}
GLUCOSE SERPL-MCNC: 92 MG/DL (ref 70–99)
GLUCOSE UR STRIP.AUTO-MCNC: NEGATIVE MG/DL
HCO3 BLDV-SCNC: 27.4 MMOL/L (ref 24–28)
HCT VFR BLD AUTO: 43.4 % (ref 40.5–52.5)
HGB BLD-MCNC: 14.5 G/DL (ref 13.5–17.5)
HGB UR QL STRIP.AUTO: NEGATIVE
KETONES UR STRIP.AUTO-MCNC: NEGATIVE MG/DL
LACTATE BLDV-SCNC: 2.8 MMOL/L (ref 0.4–1.9)
LACTATE BLDV-SCNC: 3.1 MMOL/L (ref 0.4–1.9)
LEUKOCYTE ESTERASE UR QL STRIP.AUTO: NEGATIVE
LIPASE SERPL-CCNC: 29 U/L (ref 13–60)
LYMPHOCYTES # BLD: 1.2 K/UL (ref 1–5.1)
LYMPHOCYTES NFR BLD: 14.5 %
MAGNESIUM SERPL-MCNC: 1.9 MG/DL (ref 1.8–2.4)
MCH RBC QN AUTO: 35 PG (ref 26–34)
MCHC RBC AUTO-ENTMCNC: 33.3 G/DL (ref 31–36)
MCV RBC AUTO: 104.9 FL (ref 80–100)
METHGB MFR BLDV: 0.2 % (ref 0–1.5)
MONOCYTES # BLD: 0.5 K/UL (ref 0–1.3)
MONOCYTES NFR BLD: 6.8 %
NEUTROPHILS # BLD: 6.3 K/UL (ref 1.7–7.7)
NEUTROPHILS NFR BLD: 78.2 %
NITRITE UR QL STRIP.AUTO: NEGATIVE
PCO2 BLDV: 50.5 MMHG (ref 41–51)
PH BLDV: 7.34 [PH] (ref 7.35–7.45)
PH UR STRIP.AUTO: 6 [PH] (ref 5–8)
PLATELET # BLD AUTO: 190 K/UL (ref 135–450)
PMV BLD AUTO: 7.9 FL (ref 5–10.5)
PO2 BLDV: 36.1 MMHG (ref 25–40)
POTASSIUM SERPL-SCNC: 3.9 MMOL/L (ref 3.5–5.1)
PROT SERPL-MCNC: 7.1 G/DL (ref 6.4–8.2)
PROT UR STRIP.AUTO-MCNC: NEGATIVE MG/DL
RBC # BLD AUTO: 4.14 M/UL (ref 4.2–5.9)
SAO2 % BLDV: 60 %
SODIUM SERPL-SCNC: 133 MMOL/L (ref 136–145)
SP GR UR STRIP.AUTO: 1.01 (ref 1–1.03)
TROPONIN, HIGH SENSITIVITY: 6 NG/L (ref 0–22)
TROPONIN, HIGH SENSITIVITY: 7 NG/L (ref 0–22)
UA COMPLETE W REFLEX CULTURE PNL UR: NORMAL
UA DIPSTICK W REFLEX MICRO PNL UR: NORMAL
URN SPEC COLLECT METH UR: NORMAL
UROBILINOGEN UR STRIP-ACNC: 0.2 E.U./DL
WBC # BLD AUTO: 8 K/UL (ref 4–11)

## 2024-01-18 PROCEDURE — 82077 ASSAY SPEC XCP UR&BREATH IA: CPT

## 2024-01-18 PROCEDURE — 83735 ASSAY OF MAGNESIUM: CPT

## 2024-01-18 PROCEDURE — 85025 COMPLETE CBC W/AUTO DIFF WBC: CPT

## 2024-01-18 PROCEDURE — 1200000000 HC SEMI PRIVATE

## 2024-01-18 PROCEDURE — 72125 CT NECK SPINE W/O DYE: CPT

## 2024-01-18 PROCEDURE — 82803 BLOOD GASES ANY COMBINATION: CPT

## 2024-01-18 PROCEDURE — 6370000000 HC RX 637 (ALT 250 FOR IP): Performed by: INTERNAL MEDICINE

## 2024-01-18 PROCEDURE — 80076 HEPATIC FUNCTION PANEL: CPT

## 2024-01-18 PROCEDURE — 93005 ELECTROCARDIOGRAM TRACING: CPT

## 2024-01-18 PROCEDURE — 83605 ASSAY OF LACTIC ACID: CPT

## 2024-01-18 PROCEDURE — 81003 URINALYSIS AUTO W/O SCOPE: CPT

## 2024-01-18 PROCEDURE — 6360000002 HC RX W HCPCS: Performed by: EMERGENCY MEDICINE

## 2024-01-18 PROCEDURE — 2580000003 HC RX 258: Performed by: INTERNAL MEDICINE

## 2024-01-18 PROCEDURE — 71045 X-RAY EXAM CHEST 1 VIEW: CPT

## 2024-01-18 PROCEDURE — 99285 EMERGENCY DEPT VISIT HI MDM: CPT

## 2024-01-18 PROCEDURE — 80048 BASIC METABOLIC PNL TOTAL CA: CPT

## 2024-01-18 PROCEDURE — 2500000003 HC RX 250 WO HCPCS: Performed by: EMERGENCY MEDICINE

## 2024-01-18 PROCEDURE — 84484 ASSAY OF TROPONIN QUANT: CPT

## 2024-01-18 PROCEDURE — 83690 ASSAY OF LIPASE: CPT

## 2024-01-18 PROCEDURE — 2580000003 HC RX 258: Performed by: EMERGENCY MEDICINE

## 2024-01-18 PROCEDURE — 70450 CT HEAD/BRAIN W/O DYE: CPT

## 2024-01-18 RX ORDER — CITALOPRAM 40 MG/1
40 TABLET ORAL EVERY MORNING
Status: DISCONTINUED | OUTPATIENT
Start: 2024-01-19 | End: 2024-01-22 | Stop reason: HOSPADM

## 2024-01-18 RX ORDER — POTASSIUM CHLORIDE 20 MEQ/1
40 TABLET, EXTENDED RELEASE ORAL PRN
Status: DISCONTINUED | OUTPATIENT
Start: 2024-01-18 | End: 2024-01-22 | Stop reason: HOSPADM

## 2024-01-18 RX ORDER — ONDANSETRON 2 MG/ML
4 INJECTION INTRAMUSCULAR; INTRAVENOUS EVERY 6 HOURS PRN
Status: DISCONTINUED | OUTPATIENT
Start: 2024-01-18 | End: 2024-01-22 | Stop reason: HOSPADM

## 2024-01-18 RX ORDER — PANTOPRAZOLE SODIUM 40 MG/1
40 TABLET, DELAYED RELEASE ORAL
Status: DISCONTINUED | OUTPATIENT
Start: 2024-01-19 | End: 2024-01-22 | Stop reason: HOSPADM

## 2024-01-18 RX ORDER — FOLIC ACID 1 MG/1
1 TABLET ORAL DAILY
Status: DISCONTINUED | OUTPATIENT
Start: 2024-01-19 | End: 2024-01-22 | Stop reason: HOSPADM

## 2024-01-18 RX ORDER — SODIUM CHLORIDE 9 MG/ML
INJECTION, SOLUTION INTRAVENOUS CONTINUOUS
Status: ACTIVE | OUTPATIENT
Start: 2024-01-18 | End: 2024-01-19

## 2024-01-18 RX ORDER — MULTIVITAMIN WITH IRON
1 TABLET ORAL DAILY
Status: DISCONTINUED | OUTPATIENT
Start: 2024-01-19 | End: 2024-01-22 | Stop reason: HOSPADM

## 2024-01-18 RX ORDER — SODIUM CHLORIDE 0.9 % (FLUSH) 0.9 %
5-40 SYRINGE (ML) INJECTION PRN
Status: DISCONTINUED | OUTPATIENT
Start: 2024-01-18 | End: 2024-01-22 | Stop reason: HOSPADM

## 2024-01-18 RX ORDER — LORAZEPAM 1 MG/1
4 TABLET ORAL
Status: DISCONTINUED | OUTPATIENT
Start: 2024-01-18 | End: 2024-01-22 | Stop reason: HOSPADM

## 2024-01-18 RX ORDER — LORAZEPAM 1 MG/1
2 TABLET ORAL
Status: DISCONTINUED | OUTPATIENT
Start: 2024-01-18 | End: 2024-01-22 | Stop reason: HOSPADM

## 2024-01-18 RX ORDER — SODIUM CHLORIDE 0.9 % (FLUSH) 0.9 %
5-40 SYRINGE (ML) INJECTION EVERY 12 HOURS SCHEDULED
Status: DISCONTINUED | OUTPATIENT
Start: 2024-01-18 | End: 2024-01-22 | Stop reason: HOSPADM

## 2024-01-18 RX ORDER — LORAZEPAM 2 MG/ML
2 INJECTION INTRAMUSCULAR
Status: DISCONTINUED | OUTPATIENT
Start: 2024-01-18 | End: 2024-01-22 | Stop reason: HOSPADM

## 2024-01-18 RX ORDER — LORAZEPAM 2 MG/ML
4 INJECTION INTRAMUSCULAR
Status: DISCONTINUED | OUTPATIENT
Start: 2024-01-18 | End: 2024-01-22 | Stop reason: HOSPADM

## 2024-01-18 RX ORDER — LORAZEPAM 2 MG/ML
3 INJECTION INTRAMUSCULAR
Status: DISCONTINUED | OUTPATIENT
Start: 2024-01-18 | End: 2024-01-22 | Stop reason: HOSPADM

## 2024-01-18 RX ORDER — SODIUM CHLORIDE 9 MG/ML
INJECTION, SOLUTION INTRAVENOUS PRN
Status: DISCONTINUED | OUTPATIENT
Start: 2024-01-18 | End: 2024-01-22 | Stop reason: HOSPADM

## 2024-01-18 RX ORDER — MAGNESIUM SULFATE IN WATER 40 MG/ML
2000 INJECTION, SOLUTION INTRAVENOUS PRN
Status: DISCONTINUED | OUTPATIENT
Start: 2024-01-18 | End: 2024-01-22 | Stop reason: HOSPADM

## 2024-01-18 RX ORDER — LORAZEPAM 2 MG/ML
1 INJECTION INTRAMUSCULAR
Status: DISCONTINUED | OUTPATIENT
Start: 2024-01-18 | End: 2024-01-22 | Stop reason: HOSPADM

## 2024-01-18 RX ORDER — LORAZEPAM 1 MG/1
3 TABLET ORAL
Status: DISCONTINUED | OUTPATIENT
Start: 2024-01-18 | End: 2024-01-22 | Stop reason: HOSPADM

## 2024-01-18 RX ORDER — GAUZE BANDAGE 2" X 2"
100 BANDAGE TOPICAL DAILY
Status: DISCONTINUED | OUTPATIENT
Start: 2024-01-19 | End: 2024-01-22 | Stop reason: HOSPADM

## 2024-01-18 RX ORDER — ONDANSETRON 4 MG/1
4 TABLET, ORALLY DISINTEGRATING ORAL EVERY 8 HOURS PRN
Status: DISCONTINUED | OUTPATIENT
Start: 2024-01-18 | End: 2024-01-22 | Stop reason: HOSPADM

## 2024-01-18 RX ORDER — POTASSIUM CHLORIDE 7.45 MG/ML
10 INJECTION INTRAVENOUS PRN
Status: DISCONTINUED | OUTPATIENT
Start: 2024-01-18 | End: 2024-01-22 | Stop reason: HOSPADM

## 2024-01-18 RX ORDER — POLYETHYLENE GLYCOL 3350 17 G/17G
17 POWDER, FOR SOLUTION ORAL DAILY PRN
Status: DISCONTINUED | OUTPATIENT
Start: 2024-01-18 | End: 2024-01-22 | Stop reason: HOSPADM

## 2024-01-18 RX ORDER — LORAZEPAM 1 MG/1
1 TABLET ORAL
Status: DISCONTINUED | OUTPATIENT
Start: 2024-01-18 | End: 2024-01-22 | Stop reason: HOSPADM

## 2024-01-18 RX ORDER — SODIUM CHLORIDE, SODIUM LACTATE, POTASSIUM CHLORIDE, CALCIUM CHLORIDE 600; 310; 30; 20 MG/100ML; MG/100ML; MG/100ML; MG/100ML
INJECTION, SOLUTION INTRAVENOUS ONCE
Status: COMPLETED | OUTPATIENT
Start: 2024-01-18 | End: 2024-01-18

## 2024-01-18 RX ADMIN — SODIUM CHLORIDE, PRESERVATIVE FREE 10 ML: 5 INJECTION INTRAVENOUS at 22:33

## 2024-01-18 RX ADMIN — SODIUM CHLORIDE, POTASSIUM CHLORIDE, SODIUM LACTATE AND CALCIUM CHLORIDE: 600; 310; 30; 20 INJECTION, SOLUTION INTRAVENOUS at 17:26

## 2024-01-18 RX ADMIN — THIAMINE HYDROCHLORIDE: 100 INJECTION, SOLUTION INTRAMUSCULAR; INTRAVENOUS at 18:46

## 2024-01-18 RX ADMIN — SODIUM CHLORIDE: 9 INJECTION, SOLUTION INTRAVENOUS at 22:35

## 2024-01-18 RX ADMIN — LORAZEPAM 1 MG: 1 TABLET ORAL at 22:33

## 2024-01-18 SDOH — ECONOMIC STABILITY: TRANSPORTATION INSECURITY
IN THE PAST 12 MONTHS, HAS LACK OF TRANSPORTATION KEPT YOU FROM MEETINGS, WORK, OR FROM GETTING THINGS NEEDED FOR DAILY LIVING?: NO

## 2024-01-18 SDOH — ECONOMIC STABILITY: HOUSING INSECURITY: IN THE LAST 12 MONTHS, HOW MANY PLACES HAVE YOU LIVED?: 1

## 2024-01-18 SDOH — ECONOMIC STABILITY: FOOD INSECURITY: WITHIN THE PAST 12 MONTHS, THE FOOD YOU BOUGHT JUST DIDN'T LAST AND YOU DIDN'T HAVE MONEY TO GET MORE.: NEVER TRUE

## 2024-01-18 SDOH — ECONOMIC STABILITY: INCOME INSECURITY: IN THE LAST 12 MONTHS, WAS THERE A TIME WHEN YOU WERE NOT ABLE TO PAY THE MORTGAGE OR RENT ON TIME?: NO

## 2024-01-18 SDOH — ECONOMIC STABILITY: TRANSPORTATION INSECURITY
IN THE PAST 12 MONTHS, HAS THE LACK OF TRANSPORTATION KEPT YOU FROM MEDICAL APPOINTMENTS OR FROM GETTING MEDICATIONS?: NO

## 2024-01-18 SDOH — ECONOMIC STABILITY: HOUSING INSECURITY
IN THE LAST 12 MONTHS, WAS THERE A TIME WHEN YOU DID NOT HAVE A STEADY PLACE TO SLEEP OR SLEPT IN A SHELTER (INCLUDING NOW)?: NO

## 2024-01-18 SDOH — ECONOMIC STABILITY: FOOD INSECURITY: WITHIN THE PAST 12 MONTHS, YOU WORRIED THAT YOUR FOOD WOULD RUN OUT BEFORE YOU GOT MONEY TO BUY MORE.: NEVER TRUE

## 2024-01-18 ASSESSMENT — SOCIAL DETERMINANTS OF HEALTH (SDOH): HOW HARD IS IT FOR YOU TO PAY FOR THE VERY BASICS LIKE FOOD, HOUSING, MEDICAL CARE, AND HEATING?: NOT HARD AT ALL

## 2024-01-18 ASSESSMENT — LIFESTYLE VARIABLES
HOW OFTEN DO YOU HAVE A DRINK CONTAINING ALCOHOL: 4 OR MORE TIMES A WEEK
HOW MANY STANDARD DRINKS CONTAINING ALCOHOL DO YOU HAVE ON A TYPICAL DAY: 10 OR MORE

## 2024-01-18 NOTE — ED PROVIDER NOTES
ED Attending Attestation Note     Date of evaluation: 1/18/2024    This patient was seen by the resident.  I have seen and examined the patient, agree with the workup, evaluation, management and diagnosis. The care plan has been discussed.  I have reviewed the ECG and concur with the resident's interpretation.  My assessment reveals patient who presents with alcohol use disorder status post fall.  He tells me he passed out and his neighbor called ambulance to get there quickly.  He was just hospitalized recently.  Continues to drink heavily.  Has had periods of sobriety for 4 and 7 months respectively at .  Intermittent 30+ years.  He has abrasions all over his body.  He has feces all over his lower extremities.  He is unkempt.  He has used a bathroom on the second floor he lives on the third floor.  I am worried that he is in a dangerous living environment.  A large workup was obtained on this patient including CT head cervical spine and blood work..     Cruz Bennett MD  01/18/24 3233    
Negative for abdominal pain, constipation, nausea and vomiting.   Endocrine: Negative.    Genitourinary: Negative.    Musculoskeletal:  Positive for gait problem. Negative for neck pain.        Pain with palpation of the left knee   Skin:  Positive for wound.        Superficial abrasions noted to the patients left side of the head as well as to both knees. There are old and new abrasions. The patients b/l lower extremities are covered in feces.   Allergic/Immunologic: Negative.    Neurological:  Positive for dizziness and weakness. Negative for light-headedness, numbness and headaches.   Hematological: Negative.    Psychiatric/Behavioral:          Alcoholic abuse and overuse       Past Medical, Surgical, Family, and Social History     He has a past medical history of Alcohol abuse, MRSA (methicillin resistant staph aureus) culture positive, and Seasonal allergies.  He has a past surgical history that includes Wound debridement (Left, 7/15/15); other surgical history (7/18/2015); Wound debridement (07/27/15); other surgical history (8/3/2015); and Upper gastrointestinal endoscopy (N/A, 1/4/2024).  His family history includes Colon Cancer in his mother; Coronary Art Dis in his father.  He reports that he has never smoked. He has never used smokeless tobacco. He reports that he does not currently use alcohol. He reports that he does not use drugs.    Medications     Previous Medications    ACETAMINOPHEN 650 MG TABS    Take 650 mg by mouth every 4 hours as needed    CHLORDIAZEPOXIDE (LIBRIUM) 25 MG CAPSULE    Take 1 capsule by mouth 3 times daily as needed for Anxiety for up to 30 days. Max Daily Amount: 75 mg    CITALOPRAM (CELEXA) 40 MG TABLET    Take 1 tablet by mouth every morning    DOCUSATE SODIUM (COLACE, DULCOLAX) 100 MG CAPS    Take 100 mg by mouth 2 times daily HOLD for loose stools    GABAPENTIN (NEURONTIN) 100 MG CAPSULE    Take 1 capsule by mouth 2 times daily for 30 days. Intended supply: 90 days

## 2024-01-19 LAB
ALBUMIN SERPL-MCNC: 3.3 G/DL (ref 3.4–5)
ALBUMIN/GLOB SERPL: 1.3 {RATIO} (ref 1.1–2.2)
ALP SERPL-CCNC: 79 U/L (ref 40–129)
ALT SERPL-CCNC: 35 U/L (ref 10–40)
ANION GAP SERPL CALCULATED.3IONS-SCNC: 8 MMOL/L (ref 3–16)
AST SERPL-CCNC: 37 U/L (ref 15–37)
BASOPHILS # BLD: 0 K/UL (ref 0–0.2)
BASOPHILS NFR BLD: 0.4 %
BILIRUB SERPL-MCNC: 0.8 MG/DL (ref 0–1)
BUN SERPL-MCNC: 5 MG/DL (ref 7–20)
CALCIUM SERPL-MCNC: 8.4 MG/DL (ref 8.3–10.6)
CHLORIDE SERPL-SCNC: 104 MMOL/L (ref 99–110)
CO2 SERPL-SCNC: 24 MMOL/L (ref 21–32)
CREAT SERPL-MCNC: 0.6 MG/DL (ref 0.8–1.3)
DEPRECATED RDW RBC AUTO: 15.7 % (ref 12.4–15.4)
EKG ATRIAL RATE: 71 BPM
EKG DIAGNOSIS: NORMAL
EKG P AXIS: 22 DEGREES
EKG P-R INTERVAL: 184 MS
EKG Q-T INTERVAL: 426 MS
EKG QRS DURATION: 104 MS
EKG QTC CALCULATION (BAZETT): 462 MS
EKG R AXIS: -39 DEGREES
EKG T AXIS: 12 DEGREES
EKG VENTRICULAR RATE: 71 BPM
EOSINOPHIL # BLD: 0 K/UL (ref 0–0.6)
EOSINOPHIL NFR BLD: 0.3 %
GFR SERPLBLD CREATININE-BSD FMLA CKD-EPI: >60 ML/MIN/{1.73_M2}
GLUCOSE SERPL-MCNC: 102 MG/DL (ref 70–99)
HCT VFR BLD AUTO: 40.1 % (ref 40.5–52.5)
HGB BLD-MCNC: 13.4 G/DL (ref 13.5–17.5)
LYMPHOCYTES # BLD: 1.3 K/UL (ref 1–5.1)
LYMPHOCYTES NFR BLD: 23.7 %
MCH RBC QN AUTO: 35.4 PG (ref 26–34)
MCHC RBC AUTO-ENTMCNC: 33.3 G/DL (ref 31–36)
MCV RBC AUTO: 106.3 FL (ref 80–100)
MONOCYTES # BLD: 0.5 K/UL (ref 0–1.3)
MONOCYTES NFR BLD: 10.1 %
NEUTROPHILS # BLD: 3.6 K/UL (ref 1.7–7.7)
NEUTROPHILS NFR BLD: 65.5 %
PLATELET # BLD AUTO: 161 K/UL (ref 135–450)
PMV BLD AUTO: 7.8 FL (ref 5–10.5)
POTASSIUM SERPL-SCNC: 3.8 MMOL/L (ref 3.5–5.1)
PROT SERPL-MCNC: 5.9 G/DL (ref 6.4–8.2)
RBC # BLD AUTO: 3.78 M/UL (ref 4.2–5.9)
SODIUM SERPL-SCNC: 136 MMOL/L (ref 136–145)
WBC # BLD AUTO: 5.4 K/UL (ref 4–11)

## 2024-01-19 PROCEDURE — 6370000000 HC RX 637 (ALT 250 FOR IP): Performed by: INTERNAL MEDICINE

## 2024-01-19 PROCEDURE — 2580000003 HC RX 258: Performed by: INTERNAL MEDICINE

## 2024-01-19 PROCEDURE — 36415 COLL VENOUS BLD VENIPUNCTURE: CPT

## 2024-01-19 PROCEDURE — 85025 COMPLETE CBC W/AUTO DIFF WBC: CPT

## 2024-01-19 PROCEDURE — 80053 COMPREHEN METABOLIC PANEL: CPT

## 2024-01-19 PROCEDURE — 6370000000 HC RX 637 (ALT 250 FOR IP): Performed by: NURSE PRACTITIONER

## 2024-01-19 PROCEDURE — 1200000000 HC SEMI PRIVATE

## 2024-01-19 RX ORDER — ACETAMINOPHEN 325 MG/1
650 TABLET ORAL EVERY 6 HOURS PRN
Status: DISCONTINUED | OUTPATIENT
Start: 2024-01-19 | End: 2024-01-22 | Stop reason: HOSPADM

## 2024-01-19 RX ADMIN — FOLIC ACID 1 MG: 1 TABLET ORAL at 09:45

## 2024-01-19 RX ADMIN — THIAMINE HCL TAB 100 MG 100 MG: 100 TAB at 09:45

## 2024-01-19 RX ADMIN — SODIUM CHLORIDE, PRESERVATIVE FREE 10 ML: 5 INJECTION INTRAVENOUS at 09:45

## 2024-01-19 RX ADMIN — DICLOFENAC SODIUM 4 G: 10 GEL TOPICAL at 03:12

## 2024-01-19 RX ADMIN — ACETAMINOPHEN 650 MG: 325 TABLET ORAL at 17:23

## 2024-01-19 RX ADMIN — CITALOPRAM HYDROBROMIDE 40 MG: 40 TABLET ORAL at 09:45

## 2024-01-19 RX ADMIN — PANTOPRAZOLE SODIUM 40 MG: 40 TABLET, DELAYED RELEASE ORAL at 09:46

## 2024-01-19 RX ADMIN — PANTOPRAZOLE SODIUM 40 MG: 40 TABLET, DELAYED RELEASE ORAL at 17:23

## 2024-01-19 RX ADMIN — LORAZEPAM 1 MG: 1 TABLET ORAL at 21:10

## 2024-01-19 RX ADMIN — THERA TABS 1 TABLET: TAB at 09:45

## 2024-01-19 RX ADMIN — LORAZEPAM 1 MG: 1 TABLET ORAL at 03:11

## 2024-01-19 RX ADMIN — LORAZEPAM 1 MG: 1 TABLET ORAL at 09:45

## 2024-01-19 ASSESSMENT — PAIN DESCRIPTION - DIRECTION: RADIATING_TOWARDS: DENIES

## 2024-01-19 ASSESSMENT — PAIN DESCRIPTION - LOCATION: LOCATION: HEAD

## 2024-01-19 ASSESSMENT — PAIN DESCRIPTION - FREQUENCY: FREQUENCY: INTERMITTENT

## 2024-01-19 ASSESSMENT — PAIN SCALES - GENERAL
PAINLEVEL_OUTOF10: 0
PAINLEVEL_OUTOF10: 0
PAINLEVEL_OUTOF10: 3
PAINLEVEL_OUTOF10: 0

## 2024-01-19 ASSESSMENT — PAIN DESCRIPTION - ONSET: ONSET: ON-GOING

## 2024-01-19 ASSESSMENT — PAIN DESCRIPTION - DESCRIPTORS: DESCRIPTORS: ACHING

## 2024-01-19 ASSESSMENT — PAIN DESCRIPTION - PAIN TYPE: TYPE: ACUTE PAIN

## 2024-01-19 ASSESSMENT — PAIN DESCRIPTION - ORIENTATION: ORIENTATION: MID

## 2024-01-19 ASSESSMENT — PAIN - FUNCTIONAL ASSESSMENT: PAIN_FUNCTIONAL_ASSESSMENT: ACTIVITIES ARE NOT PREVENTED

## 2024-01-19 NOTE — H&P
orders  Infusions: Normal saline infusion  PRN Meds: Refer to orders    Labs      CBC:   Recent Labs     01/18/24  1650   WBC 8.0   HGB 14.5        BMP:    Recent Labs     01/18/24  1650   *   K 3.9   CL 95*   CO2 23   BUN 5*   CREATININE 0.7*   GLUCOSE 92     Hepatic:   Recent Labs     01/18/24  1650   AST 52*   ALT 43*   BILITOT 0.3   ALKPHOS 107     Lipids: No results found for: \"CHOL\", \"HDL\", \"TRIG\"  Hemoglobin A1C: No results found for: \"LABA1C\"  TSH: No results found for: \"TSH\"  Troponin: No results found for: \"TROPONINT\"  Lactic Acid: No results for input(s): \"LACTA\" in the last 72 hours.  BNP: No results for input(s): \"PROBNP\" in the last 72 hours.  UA:  Lab Results   Component Value Date/Time    NITRU Negative 01/18/2024 04:50 PM    COLORU Yellow 01/18/2024 04:50 PM    PHUR 6.0 01/18/2024 04:50 PM    LABCAST 3-5 Fine Gran. 07/14/2015 07:33 PM    WBCUA 30 07/14/2015 07:33 PM    RBCUA 5-10 07/14/2015 07:33 PM    CLARITYU Clear 01/18/2024 04:50 PM    SPECGRAV 1.010 01/18/2024 04:50 PM    LEUKOCYTESUR Negative 01/18/2024 04:50 PM    UROBILINOGEN 0.2 01/18/2024 04:50 PM    BILIRUBINUR Negative 01/18/2024 04:50 PM    BLOODU Negative 01/18/2024 04:50 PM    GLUCOSEU Negative 01/18/2024 04:50 PM    KETUA Negative 01/18/2024 04:50 PM     Urine Cultures: No results found for: \"LABURIN\"  Blood Cultures: No results found for: \"BC\"  No results found for: \"BLOODCULT2\"  Organism: No results found for: \"ORG\"    Imaging/Diagnostics Last 24 Hours   CT CERVICAL SPINE WO CONTRAST    Result Date: 1/18/2024  PROCEDURE: Computed tomography (CT) of the cervical spine without contrast INDICATION: fall, pain COMPARISON: None TECHNIQUE: CT of the cervical spine was performed without contrast according to standard protocol. Up-to-date CT equipment and radiation dose reduction techniques were employed. FINDINGS: There is mild grade 1 anterolisthesis of C3 on C4 and C4 on C5 and C7 on T1. Vertebral bodies are normal in

## 2024-01-19 NOTE — ED NOTES
detailed history   If any further questions, please [place] RN at [phone]    Electronically signed by: Electronically signed by Catracho Mcfarland RN on 1/18/2024 at 8:42 PM      Catracho Mcfarland RN  01/18/24 2044

## 2024-01-19 NOTE — DISCHARGE INSTRUCTIONS
Alcohol and Substance Abuse Community Resources:    Information and Referrals:  [facility] ([facility]) 24 hours/ 7days - [phone]  [facility] [place] (24/7 hotline)- 856.784.7705([place]); [phone] ([place])  www.addictionservicescouncil.org;  [address], [place] [address]  FLORA Lyles II (Treatment consultant) - [phone]   (www.[employer]) or email: [email or web address]@[employer]  FLORA Lechuga () - [phone]    Self Help Resources:  Alcoholics Anonymous Hotline (www.[employer])  (24 hours/ 7days) - 470.849.3835    [address](enter on [facility]) [place], [place] [address]  [employer] [facility] [place] (www.[facility].org)- for [place] - 0-626-270-9419  [employer]ANON . [place]/ [place] AL-ANON Information Services - 2-072-517-8056  (www.[employer].org)   Narcotics Anonymous (www.[employer])  - 833.893.5701  Smart Recovery (www.smartrecovery.org) - 139.261.3128    Suicide Hotlines: toll free and available 24/7  260-808-CARE (2273)  4-212-RAWZLKJ (1-325.299.4405)  3-287-078-TALK (3-239-649-TALK) - Veterans Crisis Line  TTY: 6-676-687-4TTY    Detoxification Services/ Inpatient Treatment/ Residential Treatment Programs:  [facility] - [phone] [address], [place] [address]  [facility] ([facility]) - [phone]  [address], [place] [address]  [facility] - [phone]  [address], [place] [address]  [facility] -[phone]  [address] [place], [place] [address] [place] [address] ([facility]) - [phone] ext. [phone]  [address] [place] [address] [employer] - 5-916-266-0363 or 2-398-330-CARE  [address] [place] [address] [address] [place] [address] [facility] [place] (www.[facility]) - [phone]  [address]

## 2024-01-19 NOTE — CARE COORDINATION
Case Management Assessment  Initial Evaluation    Date/Time of Evaluation: 1/19/2024 11:39 AM  Assessment Completed by: Leonides Juárez RN    If patient is discharged prior to next notation, then this note serves as note for discharge by case management.    Patient Name: Ingrid Pearce                   YOB: 1960  Diagnosis: Syncope and collapse [R55]  Closed head injury, initial encounter [S09.90XA]  Acute alcoholic intoxication with complication (HCC) [F10.929]                   Date / Time: 1/18/2024  3:59 PM    Patient Admission Status: Inpatient   Readmission Risk (Low < 19, Mod (19-27), High > 27): Readmission Risk Score: 13.5    Current PCP: [facility] (Inactive)  PCP verified by CM? Yes    Chart Reviewed: Yes      History Provided by: Patient, Medical Record  Patient Orientation: Alert and Oriented    Patient Cognition: Alert    Hospitalization in the last 30 days (Readmission):  Yes    If yes, Readmission Assessment in CM Navigator will be completed and shown below.  Readmission Assessment  Number of Days since last admission?: 8-30 days  Previous Disposition: Home Alone  Who is being Interviewed: Patient  What was the patient's/caregiver's perception as to why they think they needed to return back to the hospital?: Other (Comment) (new sx)  Did you visit your Primary Care Physician after you left the hospital, before you returned this time?: Yes  Did you see a specialist, such as Cardiac, Pulmonary, Orthopedic Physician, etc. after you left the hospital?: No  Who advised the patient to return to the hospital?: Self-referral  Does the patient report anything that got in the way of taking their medications?: No  In our efforts to provide the best possible care to you and others like you, can you think of anything that we could have done to help you after you left the hospital the first time, so that you might not have needed to return so soon?: Other (Comment) (NA)       Advance

## 2024-01-19 NOTE — PLAN OF CARE
Problem: Safety - Adult  Goal: Free from fall injury  1/19/2024 1423 by Shayy Arthur RN  Outcome: Progressing  Flowsheets (Taken 1/19/2024 1423)  Free From Fall Injury:   Instruct family/caregiver on patient safety   Based on caregiver fall risk screen, instruct family/caregiver to ask for assistance with transferring infant if caregiver noted to have fall risk factors  1/19/2024 0615 by Brenna Nolasco RN  Outcome: Progressing     Problem: Discharge Planning  Goal: Discharge to home or other facility with appropriate resources  1/19/2024 1423 by Shayy Arthur RN  Outcome: Progressing  Flowsheets (Taken 1/19/2024 1423)  Discharge to home or other facility with appropriate resources: Identify barriers to discharge with patient and caregiver  1/19/2024 0615 by Brenna Nolasco, RN  Outcome: Progressing

## 2024-01-20 PROCEDURE — 1200000000 HC SEMI PRIVATE

## 2024-01-20 PROCEDURE — 2580000003 HC RX 258: Performed by: INTERNAL MEDICINE

## 2024-01-20 PROCEDURE — 6370000000 HC RX 637 (ALT 250 FOR IP): Performed by: INTERNAL MEDICINE

## 2024-01-20 RX ADMIN — SODIUM CHLORIDE, PRESERVATIVE FREE 10 ML: 5 INJECTION INTRAVENOUS at 20:24

## 2024-01-20 RX ADMIN — THERA TABS 1 TABLET: TAB at 08:45

## 2024-01-20 RX ADMIN — LORAZEPAM 2 MG: 1 TABLET ORAL at 20:23

## 2024-01-20 RX ADMIN — ACETAMINOPHEN 650 MG: 325 TABLET ORAL at 17:28

## 2024-01-20 RX ADMIN — PANTOPRAZOLE SODIUM 40 MG: 40 TABLET, DELAYED RELEASE ORAL at 05:57

## 2024-01-20 RX ADMIN — FOLIC ACID 1 MG: 1 TABLET ORAL at 08:45

## 2024-01-20 RX ADMIN — THIAMINE HCL TAB 100 MG 100 MG: 100 TAB at 08:45

## 2024-01-20 RX ADMIN — LORAZEPAM 1 MG: 1 TABLET ORAL at 13:46

## 2024-01-20 RX ADMIN — PANTOPRAZOLE SODIUM 40 MG: 40 TABLET, DELAYED RELEASE ORAL at 15:08

## 2024-01-20 RX ADMIN — SODIUM CHLORIDE, PRESERVATIVE FREE 10 ML: 5 INJECTION INTRAVENOUS at 08:46

## 2024-01-20 RX ADMIN — CITALOPRAM HYDROBROMIDE 40 MG: 40 TABLET ORAL at 08:45

## 2024-01-20 RX ADMIN — LORAZEPAM 1 MG: 1 TABLET ORAL at 10:19

## 2024-01-20 RX ADMIN — LORAZEPAM 1 MG: 1 TABLET ORAL at 17:28

## 2024-01-20 NOTE — PLAN OF CARE
Problem: Safety - Adult  Goal: Free from fall injury  1/19/2024 2205 by Shahriar Finnegan, RN  Outcome: Progressing    Bed alarm on, bed on the lowest position, call lights within reach, assist the patient going to the bathroom     Problem: Discharge Planning  Goal: Discharge to home or other facility with appropriate resources  1/19/2024 2205 by Shahriar Finnegan, RN  Outcome: Progressing    Possible discharge tomorrow to house.

## 2024-01-21 PROCEDURE — 6370000000 HC RX 637 (ALT 250 FOR IP): Performed by: INTERNAL MEDICINE

## 2024-01-21 PROCEDURE — 1200000000 HC SEMI PRIVATE

## 2024-01-21 PROCEDURE — 2580000003 HC RX 258: Performed by: INTERNAL MEDICINE

## 2024-01-21 RX ADMIN — LORAZEPAM 2 MG: 1 TABLET ORAL at 20:06

## 2024-01-21 RX ADMIN — FOLIC ACID 1 MG: 1 TABLET ORAL at 08:03

## 2024-01-21 RX ADMIN — PANTOPRAZOLE SODIUM 40 MG: 40 TABLET, DELAYED RELEASE ORAL at 16:37

## 2024-01-21 RX ADMIN — SODIUM CHLORIDE, PRESERVATIVE FREE 10 ML: 5 INJECTION INTRAVENOUS at 08:04

## 2024-01-21 RX ADMIN — CITALOPRAM HYDROBROMIDE 40 MG: 40 TABLET ORAL at 08:03

## 2024-01-21 RX ADMIN — ACETAMINOPHEN 650 MG: 325 TABLET ORAL at 16:37

## 2024-01-21 RX ADMIN — PANTOPRAZOLE SODIUM 40 MG: 40 TABLET, DELAYED RELEASE ORAL at 06:23

## 2024-01-21 RX ADMIN — LORAZEPAM 1 MG: 1 TABLET ORAL at 16:37

## 2024-01-21 RX ADMIN — THIAMINE HCL TAB 100 MG 100 MG: 100 TAB at 08:04

## 2024-01-21 RX ADMIN — SODIUM CHLORIDE, PRESERVATIVE FREE 10 ML: 5 INJECTION INTRAVENOUS at 20:07

## 2024-01-21 RX ADMIN — THERA TABS 1 TABLET: TAB at 08:03

## 2024-01-21 RX ADMIN — LORAZEPAM 1 MG: 1 TABLET ORAL at 13:08

## 2024-01-21 RX ADMIN — LORAZEPAM 1 MG: 1 TABLET ORAL at 07:35

## 2024-01-21 NOTE — PLAN OF CARE
Problem: Safety - Adult  Goal: Free from fall injury  Outcome: Progressing  Flowsheets (Taken 1/20/2024 2208)  Free From Fall Injury: Based on caregiver fall risk screen, instruct family/caregiver to ask for assistance with transferring infant if caregiver noted to have fall risk factors  Note: Patient came with multiple fall due to alcohol intoxication. Discuss with patient safety measures and ensure all are in place. Bed lock in lowest position, bed alarm on call light and bedside table with patients belonging within patient reach.      Problem: Discharge Planning  Goal: Discharge to home or other facility with appropriate resources  Outcome: Progressing

## 2024-01-22 VITALS
DIASTOLIC BLOOD PRESSURE: 75 MMHG | WEIGHT: 196.65 LBS | TEMPERATURE: 98 F | BODY MASS INDEX: 28.15 KG/M2 | HEIGHT: 70 IN | HEART RATE: 70 BPM | OXYGEN SATURATION: 98 % | RESPIRATION RATE: 16 BRPM | SYSTOLIC BLOOD PRESSURE: 126 MMHG

## 2024-01-22 LAB
ALBUMIN SERPL-MCNC: 3.7 G/DL (ref 3.4–5)
ALBUMIN/GLOB SERPL: 1.2 {RATIO} (ref 1.1–2.2)
ALP SERPL-CCNC: 89 U/L (ref 40–129)
ALT SERPL-CCNC: 43 U/L (ref 10–40)
ANION GAP SERPL CALCULATED.3IONS-SCNC: 11 MMOL/L (ref 3–16)
AST SERPL-CCNC: 53 U/L (ref 15–37)
BILIRUB SERPL-MCNC: 0.6 MG/DL (ref 0–1)
BUN SERPL-MCNC: 6 MG/DL (ref 7–20)
CALCIUM SERPL-MCNC: 9.4 MG/DL (ref 8.3–10.6)
CHLORIDE SERPL-SCNC: 104 MMOL/L (ref 99–110)
CO2 SERPL-SCNC: 21 MMOL/L (ref 21–32)
CREAT SERPL-MCNC: 0.8 MG/DL (ref 0.8–1.3)
GFR SERPLBLD CREATININE-BSD FMLA CKD-EPI: >60 ML/MIN/{1.73_M2}
GLUCOSE SERPL-MCNC: 109 MG/DL (ref 70–99)
POTASSIUM SERPL-SCNC: 4.2 MMOL/L (ref 3.5–5.1)
PROT SERPL-MCNC: 6.8 G/DL (ref 6.4–8.2)
SODIUM SERPL-SCNC: 136 MMOL/L (ref 136–145)

## 2024-01-22 PROCEDURE — 6370000000 HC RX 637 (ALT 250 FOR IP): Performed by: INTERNAL MEDICINE

## 2024-01-22 PROCEDURE — 36415 COLL VENOUS BLD VENIPUNCTURE: CPT

## 2024-01-22 PROCEDURE — 80053 COMPREHEN METABOLIC PANEL: CPT

## 2024-01-22 PROCEDURE — 2580000003 HC RX 258: Performed by: INTERNAL MEDICINE

## 2024-01-22 RX ORDER — MULTIVITAMIN WITH IRON
1 TABLET ORAL DAILY
Qty: 30 TABLET | Refills: 0 | Status: SHIPPED | OUTPATIENT
Start: 2024-01-23

## 2024-01-22 RX ADMIN — CITALOPRAM HYDROBROMIDE 40 MG: 40 TABLET ORAL at 09:29

## 2024-01-22 RX ADMIN — LORAZEPAM 1 MG: 1 TABLET ORAL at 09:29

## 2024-01-22 RX ADMIN — LORAZEPAM 1 MG: 1 TABLET ORAL at 00:04

## 2024-01-22 RX ADMIN — THERA TABS 1 TABLET: TAB at 09:29

## 2024-01-22 RX ADMIN — ONDANSETRON 4 MG: 4 TABLET, ORALLY DISINTEGRATING ORAL at 00:04

## 2024-01-22 RX ADMIN — THIAMINE HCL TAB 100 MG 100 MG: 100 TAB at 09:29

## 2024-01-22 RX ADMIN — SODIUM CHLORIDE, PRESERVATIVE FREE 10 ML: 5 INJECTION INTRAVENOUS at 09:30

## 2024-01-22 RX ADMIN — ONDANSETRON 4 MG: 4 TABLET, ORALLY DISINTEGRATING ORAL at 11:54

## 2024-01-22 RX ADMIN — LORAZEPAM 1 MG: 1 TABLET ORAL at 11:54

## 2024-01-22 RX ADMIN — FOLIC ACID 1 MG: 1 TABLET ORAL at 09:29

## 2024-01-22 RX ADMIN — PANTOPRAZOLE SODIUM 40 MG: 40 TABLET, DELAYED RELEASE ORAL at 06:17

## 2024-01-22 NOTE — PLAN OF CARE
Problem: Safety - Adult  Goal: Free from fall injury  1/22/2024 1029 by Kira Christian RN  Outcome: Adequate for Discharge  1/21/2024 2131 by Shanta Juarez RN  Outcome: Progressing     Problem: Discharge Planning  Goal: Discharge to home or other facility with appropriate resources  1/22/2024 1029 by Kira Christian RN  Outcome: Adequate for Discharge  1/21/2024 2131 by Shanta Juarez RN  Outcome: Progressing     Problem: Pain  Goal: Verbalizes/displays adequate comfort level or baseline comfort level  Outcome: Adequate for Discharge
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
## 2024-01-22 NOTE — CARE COORDINATION
Case Management Assessment            Discharge Note                    Date / Time of Note: 1/22/2024 1:00 PM                  Discharge Note Completed by: Martha Mills RN    Patient Name: Ingrid Pearce   YOB: 1960  Diagnosis: Syncope and collapse [R55]  Closed head injury, initial encounter [S09.90XA]  Acute alcoholic intoxication with complication (HCC) [F10.929]   Date / Time: 1/18/2024  3:59 PM    Current PCP: [facility] (Inactive)  Clinic patient: No    Hospitalization in the last 30 days: Yes  Readmission Assessment  Number of Days since last admission?: 8-30 days  Previous Disposition: Home Alone  Who is being Interviewed: Patient  What was the patient's/caregiver's perception as to why they think they needed to return back to the hospital?: Other (Comment) (new sx)  Did you visit your Primary Care Physician after you left the hospital, before you returned this time?: Yes  Did you see a specialist, such as Cardiac, Pulmonary, Orthopedic Physician, etc. after you left the hospital?: No  Who advised the patient to return to the hospital?: Self-referral  Does the patient report anything that got in the way of taking their medications?: No  In our efforts to provide the best possible care to you and others like you, can you think of anything that we could have done to help you after you left the hospital the first time, so that you might not have needed to return so soon?: Other (Comment) (NA)    Advance Directives:  Code Status: Full Code  [place] DNR form completed and on chart: Not Indicated    Financial:  Payor: [employer] MEDICARE / Plan: [employer] CHOICE-PPO MEDICARE / Product Type: *No Product type* /      Pharmacy:    [id number] [place], [place] - [address] - P [phone] - F [phone] [address]  [facility] [address]  Phone: [phone] Fax: [phone]      Assistance purchasing medications?: Potential Assistance Purchasing

## (undated) DEVICE — FORCEPS BX L240CM WRK CHN 2.8MM STD CAP W/ NDL MIC MESH

## (undated) DEVICE — CANNULA SAMP CO2 AD GRN 7FT CO2 AND 7FT O2 TBNG UNIV CONN